# Patient Record
Sex: MALE | Race: WHITE | NOT HISPANIC OR LATINO | URBAN - METROPOLITAN AREA
[De-identification: names, ages, dates, MRNs, and addresses within clinical notes are randomized per-mention and may not be internally consistent; named-entity substitution may affect disease eponyms.]

---

## 2017-12-16 ENCOUNTER — INPATIENT (INPATIENT)
Facility: HOSPITAL | Age: 82
LOS: 5 days | Discharge: NURSING, REHAB OR LONG-TERM CARE | End: 2017-12-22
Attending: INTERNAL MEDICINE | Admitting: INTERNAL MEDICINE
Payer: MEDICARE

## 2017-12-16 VITALS
TEMPERATURE: 98 F | RESPIRATION RATE: 18 BRPM | SYSTOLIC BLOOD PRESSURE: 157 MMHG | HEART RATE: 68 BPM | OXYGEN SATURATION: 96 % | DIASTOLIC BLOOD PRESSURE: 66 MMHG

## 2017-12-16 DIAGNOSIS — M25.469 EFFUSION, UNSPECIFIED KNEE: ICD-10-CM

## 2017-12-16 DIAGNOSIS — Z95.0 PRESENCE OF CARDIAC PACEMAKER: Chronic | ICD-10-CM

## 2017-12-16 LAB
ALBUMIN SERPL ELPH-MCNC: 3.7 G/DL — SIGNIFICANT CHANGE UP (ref 3.3–5)
ALP SERPL-CCNC: 47 U/L — SIGNIFICANT CHANGE UP (ref 40–120)
ALT FLD-CCNC: 10 U/L — SIGNIFICANT CHANGE UP (ref 4–41)
APTT BLD: 35.3 SEC — SIGNIFICANT CHANGE UP (ref 27.5–37.4)
AST SERPL-CCNC: 25 U/L — SIGNIFICANT CHANGE UP (ref 4–40)
BASOPHILS # BLD AUTO: 0.04 K/UL — SIGNIFICANT CHANGE UP (ref 0–0.2)
BASOPHILS NFR BLD AUTO: 0.4 % — SIGNIFICANT CHANGE UP (ref 0–2)
BILIRUB SERPL-MCNC: 0.4 MG/DL — SIGNIFICANT CHANGE UP (ref 0.2–1.2)
BUN SERPL-MCNC: 22 MG/DL — SIGNIFICANT CHANGE UP (ref 7–23)
CALCIUM SERPL-MCNC: 9.3 MG/DL — SIGNIFICANT CHANGE UP (ref 8.4–10.5)
CHLORIDE SERPL-SCNC: 103 MMOL/L — SIGNIFICANT CHANGE UP (ref 98–107)
CO2 SERPL-SCNC: 22 MMOL/L — SIGNIFICANT CHANGE UP (ref 22–31)
CREAT SERPL-MCNC: 0.97 MG/DL — SIGNIFICANT CHANGE UP (ref 0.5–1.3)
CRP SERPL-MCNC: 10.4 MG/L — HIGH
EOSINOPHIL # BLD AUTO: 0.04 K/UL — SIGNIFICANT CHANGE UP (ref 0–0.5)
EOSINOPHIL NFR BLD AUTO: 0.4 % — SIGNIFICANT CHANGE UP (ref 0–6)
ERYTHROCYTE [SEDIMENTATION RATE] IN BLOOD: 79 MM/HR — HIGH (ref 1–15)
GLUCOSE SERPL-MCNC: 135 MG/DL — HIGH (ref 70–99)
HCT VFR BLD CALC: 31.2 % — LOW (ref 39–50)
HGB BLD-MCNC: 10.2 G/DL — LOW (ref 13–17)
IMM GRANULOCYTES # BLD AUTO: 0.04 # — SIGNIFICANT CHANGE UP
IMM GRANULOCYTES NFR BLD AUTO: 0.4 % — SIGNIFICANT CHANGE UP (ref 0–1.5)
INR BLD: 1.37 — HIGH (ref 0.88–1.17)
LYMPHOCYTES # BLD AUTO: 2.68 K/UL — SIGNIFICANT CHANGE UP (ref 1–3.3)
LYMPHOCYTES # BLD AUTO: 25.2 % — SIGNIFICANT CHANGE UP (ref 13–44)
MCHC RBC-ENTMCNC: 31 PG — SIGNIFICANT CHANGE UP (ref 27–34)
MCHC RBC-ENTMCNC: 32.7 % — SIGNIFICANT CHANGE UP (ref 32–36)
MCV RBC AUTO: 94.8 FL — SIGNIFICANT CHANGE UP (ref 80–100)
MONOCYTES # BLD AUTO: 1.04 K/UL — HIGH (ref 0–0.9)
MONOCYTES NFR BLD AUTO: 9.8 % — SIGNIFICANT CHANGE UP (ref 2–14)
NEUTROPHILS # BLD AUTO: 6.78 K/UL — SIGNIFICANT CHANGE UP (ref 1.8–7.4)
NEUTROPHILS NFR BLD AUTO: 63.8 % — SIGNIFICANT CHANGE UP (ref 43–77)
NRBC # FLD: 0 — SIGNIFICANT CHANGE UP
PLATELET # BLD AUTO: 274 K/UL — SIGNIFICANT CHANGE UP (ref 150–400)
PMV BLD: 10 FL — SIGNIFICANT CHANGE UP (ref 7–13)
POTASSIUM SERPL-MCNC: 4.7 MMOL/L — SIGNIFICANT CHANGE UP (ref 3.5–5.3)
POTASSIUM SERPL-SCNC: 4.7 MMOL/L — SIGNIFICANT CHANGE UP (ref 3.5–5.3)
PROT SERPL-MCNC: 6.9 G/DL — SIGNIFICANT CHANGE UP (ref 6–8.3)
PROTHROM AB SERPL-ACNC: 15.8 SEC — HIGH (ref 9.8–13.1)
RBC # BLD: 3.29 M/UL — LOW (ref 4.2–5.8)
RBC # FLD: 16.3 % — HIGH (ref 10.3–14.5)
SODIUM SERPL-SCNC: 140 MMOL/L — SIGNIFICANT CHANGE UP (ref 135–145)
WBC # BLD: 10.62 K/UL — HIGH (ref 3.8–10.5)
WBC # FLD AUTO: 10.62 K/UL — HIGH (ref 3.8–10.5)

## 2017-12-16 PROCEDURE — 73562 X-RAY EXAM OF KNEE 3: CPT | Mod: 26,LT

## 2017-12-16 RX ORDER — EPINEPHRINE 0.3 MG/.3ML
0.3 INJECTION INTRAMUSCULAR; SUBCUTANEOUS
Qty: 0.3 | Refills: 0 | OUTPATIENT
Start: 2017-12-16 | End: 2017-12-17

## 2017-12-16 RX ORDER — ACETAMINOPHEN 500 MG
650 TABLET ORAL ONCE
Qty: 0 | Refills: 0 | Status: COMPLETED | OUTPATIENT
Start: 2017-12-16 | End: 2017-12-16

## 2017-12-16 RX ORDER — MORPHINE SULFATE 50 MG/1
4 CAPSULE, EXTENDED RELEASE ORAL ONCE
Qty: 0 | Refills: 0 | Status: DISCONTINUED | OUTPATIENT
Start: 2017-12-16 | End: 2017-12-16

## 2017-12-16 RX ORDER — CETIRIZINE HYDROCHLORIDE 10 MG/1
1 TABLET ORAL
Qty: 10 | Refills: 0 | OUTPATIENT
Start: 2017-12-16 | End: 2017-12-25

## 2017-12-16 RX ORDER — FAMOTIDINE 10 MG/ML
1 INJECTION INTRAVENOUS
Qty: 20 | Refills: 0 | OUTPATIENT
Start: 2017-12-16 | End: 2017-12-25

## 2017-12-16 RX ORDER — OXYCODONE AND ACETAMINOPHEN 5; 325 MG/1; MG/1
1 TABLET ORAL ONCE
Qty: 0 | Refills: 0 | Status: DISCONTINUED | OUTPATIENT
Start: 2017-12-16 | End: 2017-12-16

## 2017-12-16 RX ADMIN — Medication 650 MILLIGRAM(S): at 21:42

## 2017-12-16 RX ADMIN — MORPHINE SULFATE 4 MILLIGRAM(S): 50 CAPSULE, EXTENDED RELEASE ORAL at 21:42

## 2017-12-16 NOTE — ED PROVIDER NOTE - PMH
Congestive heart failure, unspecified congestive heart failure chronicity, unspecified congestive heart failure type    Essential hypertension    Pacemaker

## 2017-12-16 NOTE — ED PROVIDER NOTE - ATTENDING CONTRIBUTION TO CARE
Attending Statement: I have personally seen and examined this patient. I have fully participated in the care of this patient. I have reviewed all pertinent clinical information, including history physical exam, plan and the Resident's note and agree except as noted   89yo M hx of CHF, PPM, on eliquis, OA, HTN, pw left knee pain since this morning. pt woke up this morning w swelling and pain wo any hx of trauma. no numbness or weakness. not able to walk due to pain. no fever or chills. no numbness or weakness.   Vital signs noted. pt looks uncomfortable. no shortening or rotation of lower ext. +swelling of the left knee w dec rom due to pain. not warm. no red. no calf tenderness.   plan labs, pain med, re assess

## 2017-12-16 NOTE — ED ADULT NURSE NOTE - OBJECTIVE STATEMENT
Patient rec'd for left knee pain and swelling started this am. No trauma or injuries. On eliquis. No wounds or bruising observed at bedside. Unable to eleavte leg tdue to pain, walks at home with walker currently unable to ambulate. Dependent edema observed around b/l ankles, daughter states it is more than usual. No warmth or redness observed to affected knee. Appears uncomfortable grunting and endorses pain when moving extremity.  Hx of PPM, AFiB, CHF, Cardiomyopathy, HTN

## 2017-12-16 NOTE — ED PROVIDER NOTE - MUSCULOSKELETAL MINIMAL EXAM
left knee swelling, pain with passive ROM. Neurovascularly intact distal to injury. No skin changes. No warmth of joint/atraumatic

## 2017-12-16 NOTE — ED PROVIDER NOTE - MEDICAL DECISION MAKING DETAILS
89M CHF PPM on eliquis HTN p/w left knee swelling and pain concerning for hemarthrosis. Septic joint unlikely. Afebrile. No erythema or warmth. History of falls but denies fall today or yesterday. Xray, labs, ortho, pain control, rectal temp

## 2017-12-16 NOTE — ED PROVIDER NOTE - PROGRESS NOTE DETAILS
No fracture on xray. Most likely hemarthrosis. Will admit as pt is unable to ambulate. Pain mildly improved, still requiring more pain meds. Will admit for pain control and ortho consult

## 2017-12-16 NOTE — ED PROVIDER NOTE - OBJECTIVE STATEMENT
89yo male pmh CHF, PPM on eliquis, OA, HTN p/w left knee pain and swelling since this morning. No trauma or falls in the past 2 months. C/o subjective fevers. Unable to ambulate 2/2 pain. No chills. 1 episode of posttussive emesis today. Back pain at baseline. Denies abdominal pain, diarrhea, chest pain, dyspnea.    Translation by daughter and daughter-in-law

## 2017-12-17 DIAGNOSIS — Z79.899 OTHER LONG TERM (CURRENT) DRUG THERAPY: ICD-10-CM

## 2017-12-17 DIAGNOSIS — I10 ESSENTIAL (PRIMARY) HYPERTENSION: ICD-10-CM

## 2017-12-17 DIAGNOSIS — M54.9 DORSALGIA, UNSPECIFIED: ICD-10-CM

## 2017-12-17 DIAGNOSIS — M25.469 EFFUSION, UNSPECIFIED KNEE: ICD-10-CM

## 2017-12-17 DIAGNOSIS — Z29.9 ENCOUNTER FOR PROPHYLACTIC MEASURES, UNSPECIFIED: ICD-10-CM

## 2017-12-17 DIAGNOSIS — Z51.81 ENCOUNTER FOR THERAPEUTIC DRUG LEVEL MONITORING: ICD-10-CM

## 2017-12-17 DIAGNOSIS — R63.4 ABNORMAL WEIGHT LOSS: ICD-10-CM

## 2017-12-17 DIAGNOSIS — I50.9 HEART FAILURE, UNSPECIFIED: ICD-10-CM

## 2017-12-17 DIAGNOSIS — E86.0 DEHYDRATION: ICD-10-CM
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
## 2017-12-17 DIAGNOSIS — M25.00 HEMARTHROSIS, UNSPECIFIED JOINT: ICD-10-CM

## 2017-12-17 DIAGNOSIS — I48.91 UNSPECIFIED ATRIAL FIBRILLATION: ICD-10-CM

## 2017-12-17 LAB
APPEARANCE UR: CLEAR — SIGNIFICANT CHANGE UP
BILIRUB UR-MCNC: NEGATIVE — SIGNIFICANT CHANGE UP
BLOOD UR QL VISUAL: NEGATIVE — SIGNIFICANT CHANGE UP
BODY FLUID TYPE: SIGNIFICANT CHANGE UP
BUN SERPL-MCNC: 25 MG/DL — HIGH (ref 7–23)
CALCIUM SERPL-MCNC: 9.1 MG/DL — SIGNIFICANT CHANGE UP (ref 8.4–10.5)
CHLORIDE SERPL-SCNC: 104 MMOL/L — SIGNIFICANT CHANGE UP (ref 98–107)
CLARITY SPEC: SIGNIFICANT CHANGE UP
CO2 SERPL-SCNC: 18 MMOL/L — LOW (ref 22–31)
COLOR FLD: SIGNIFICANT CHANGE UP
COLOR SPEC: YELLOW — SIGNIFICANT CHANGE UP
CREAT SERPL-MCNC: 0.99 MG/DL — SIGNIFICANT CHANGE UP (ref 0.5–1.3)
CRYSTALS FLD MICRO: SIGNIFICANT CHANGE UP
EOSINOPHIL # FLD: 10 % — SIGNIFICANT CHANGE UP
GLUCOSE SERPL-MCNC: 122 MG/DL — HIGH (ref 70–99)
GLUCOSE UR-MCNC: NEGATIVE — SIGNIFICANT CHANGE UP
GRAM STN FLD: SIGNIFICANT CHANGE UP
HCT VFR BLD CALC: 30.5 % — LOW (ref 39–50)
HGB BLD-MCNC: 10 G/DL — LOW (ref 13–17)
HYALINE CASTS # UR AUTO: SIGNIFICANT CHANGE UP (ref 0–?)
KETONES UR-MCNC: SIGNIFICANT CHANGE UP
LEUKOCYTE ESTERASE UR-ACNC: NEGATIVE — SIGNIFICANT CHANGE UP
LYMPHOCYTES NFR FLD: 27 % — SIGNIFICANT CHANGE UP
MAGNESIUM SERPL-MCNC: 2.2 MG/DL — SIGNIFICANT CHANGE UP (ref 1.6–2.6)
MCHC RBC-ENTMCNC: 31.4 PG — SIGNIFICANT CHANGE UP (ref 27–34)
MCHC RBC-ENTMCNC: 32.8 % — SIGNIFICANT CHANGE UP (ref 32–36)
MCV RBC AUTO: 95.9 FL — SIGNIFICANT CHANGE UP (ref 80–100)
MONOCYTES # FLD: 2 % — SIGNIFICANT CHANGE UP
MUCOUS THREADS # UR AUTO: SIGNIFICANT CHANGE UP
NEUTS SEG NFR FLD MANUAL: 61 % — SIGNIFICANT CHANGE UP
NITRITE UR-MCNC: NEGATIVE — SIGNIFICANT CHANGE UP
NRBC # FLD: 0 — SIGNIFICANT CHANGE UP
PH UR: 5.5 — SIGNIFICANT CHANGE UP (ref 4.6–8)
PHOSPHATE SERPL-MCNC: 3.5 MG/DL — SIGNIFICANT CHANGE UP (ref 2.5–4.5)
PLATELET # BLD AUTO: 257 K/UL — SIGNIFICANT CHANGE UP (ref 150–400)
PMV BLD: 10 FL — SIGNIFICANT CHANGE UP (ref 7–13)
POTASSIUM SERPL-MCNC: 4.6 MMOL/L — SIGNIFICANT CHANGE UP (ref 3.5–5.3)
POTASSIUM SERPL-SCNC: 4.6 MMOL/L — SIGNIFICANT CHANGE UP (ref 3.5–5.3)
PROT UR-MCNC: 30 MG/DL — HIGH
RBC # BLD: 3.18 M/UL — LOW (ref 4.2–5.8)
RBC # FLD: 16.6 % — HIGH (ref 10.3–14.5)
RBC CASTS # UR COMP ASSIST: SIGNIFICANT CHANGE UP (ref 0–?)
RCV VOL RI: HIGH CELL/UL (ref 0–5)
SODIUM SERPL-SCNC: 139 MMOL/L — SIGNIFICANT CHANGE UP (ref 135–145)
SP GR SPEC: 1.02 — SIGNIFICANT CHANGE UP (ref 1–1.04)
SPECIMEN SOURCE: SIGNIFICANT CHANGE UP
SQUAMOUS # UR AUTO: SIGNIFICANT CHANGE UP
TOTAL CELLS COUNTED, BODY FLUID: 100 CELLS — SIGNIFICANT CHANGE UP
TOTAL NUCLEATED CELL COUNT, BODY FLUID: 2436 CELL/UL — HIGH (ref 0–5)
UROBILINOGEN FLD QL: NORMAL MG/DL — SIGNIFICANT CHANGE UP
WBC # BLD: 13.9 K/UL — HIGH (ref 3.8–10.5)
WBC # FLD AUTO: 13.9 K/UL — HIGH (ref 3.8–10.5)
WBC UR QL: SIGNIFICANT CHANGE UP (ref 0–?)

## 2017-12-17 PROCEDURE — 93010 ELECTROCARDIOGRAM REPORT: CPT

## 2017-12-17 PROCEDURE — 99233 SBSQ HOSP IP/OBS HIGH 50: CPT | Mod: GC

## 2017-12-17 PROCEDURE — 72100 X-RAY EXAM L-S SPINE 2/3 VWS: CPT | Mod: 26

## 2017-12-17 PROCEDURE — 99223 1ST HOSP IP/OBS HIGH 75: CPT | Mod: GC

## 2017-12-17 RX ORDER — LIDOCAINE 4 G/100G
1 CREAM TOPICAL DAILY
Qty: 0 | Refills: 0 | Status: DISCONTINUED | OUTPATIENT
Start: 2017-12-17 | End: 2017-12-22

## 2017-12-17 RX ORDER — ACETAMINOPHEN 500 MG
650 TABLET ORAL EVERY 6 HOURS
Qty: 0 | Refills: 0 | Status: DISCONTINUED | OUTPATIENT
Start: 2017-12-17 | End: 2017-12-17

## 2017-12-17 RX ORDER — OLANZAPINE 15 MG/1
5 TABLET, FILM COATED ORAL ONCE
Qty: 0 | Refills: 0 | Status: COMPLETED | OUTPATIENT
Start: 2017-12-17 | End: 2017-12-17

## 2017-12-17 RX ORDER — FERROUS SULFATE 325(65) MG
1 TABLET ORAL
Qty: 0 | Refills: 0 | COMMUNITY

## 2017-12-17 RX ORDER — ATORVASTATIN CALCIUM 80 MG/1
10 TABLET, FILM COATED ORAL AT BEDTIME
Qty: 0 | Refills: 0 | Status: DISCONTINUED | OUTPATIENT
Start: 2017-12-17 | End: 2017-12-22

## 2017-12-17 RX ORDER — LOSARTAN POTASSIUM 100 MG/1
50 TABLET, FILM COATED ORAL DAILY
Qty: 0 | Refills: 0 | Status: DISCONTINUED | OUTPATIENT
Start: 2017-12-17 | End: 2017-12-22

## 2017-12-17 RX ORDER — AMLODIPINE BESYLATE 2.5 MG/1
1 TABLET ORAL
Qty: 0 | Refills: 0 | COMMUNITY

## 2017-12-17 RX ORDER — LIDOCAINE 4 G/100G
1 CREAM TOPICAL ONCE
Qty: 0 | Refills: 0 | Status: COMPLETED | OUTPATIENT
Start: 2017-12-17 | End: 2017-12-17

## 2017-12-17 RX ORDER — FERROUS SULFATE 325(65) MG
325 TABLET ORAL DAILY
Qty: 0 | Refills: 0 | Status: DISCONTINUED | OUTPATIENT
Start: 2017-12-17 | End: 2017-12-22

## 2017-12-17 RX ORDER — OXYCODONE HYDROCHLORIDE 5 MG/1
5 TABLET ORAL ONCE
Qty: 0 | Refills: 0 | Status: DISCONTINUED | OUTPATIENT
Start: 2017-12-17 | End: 2017-12-17

## 2017-12-17 RX ORDER — METOPROLOL TARTRATE 50 MG
1 TABLET ORAL
Qty: 0 | Refills: 0 | COMMUNITY

## 2017-12-17 RX ORDER — KETOROLAC TROMETHAMINE 30 MG/ML
15 SYRINGE (ML) INJECTION ONCE
Qty: 0 | Refills: 0 | Status: DISCONTINUED | OUTPATIENT
Start: 2017-12-17 | End: 2017-12-17

## 2017-12-17 RX ORDER — ACETAMINOPHEN 500 MG
650 TABLET ORAL EVERY 8 HOURS
Qty: 0 | Refills: 0 | Status: DISCONTINUED | OUTPATIENT
Start: 2017-12-17 | End: 2017-12-22

## 2017-12-17 RX ORDER — AMLODIPINE BESYLATE 2.5 MG/1
5 TABLET ORAL DAILY
Qty: 0 | Refills: 0 | Status: DISCONTINUED | OUTPATIENT
Start: 2017-12-17 | End: 2017-12-22

## 2017-12-17 RX ORDER — MAGNESIUM OXIDE 400 MG ORAL TABLET 241.3 MG
1 TABLET ORAL
Qty: 0 | Refills: 0 | COMMUNITY

## 2017-12-17 RX ORDER — METOPROLOL TARTRATE 50 MG
25 TABLET ORAL
Qty: 0 | Refills: 0 | Status: DISCONTINUED | OUTPATIENT
Start: 2017-12-17 | End: 2017-12-22

## 2017-12-17 RX ADMIN — Medication 650 MILLIGRAM(S): at 22:00

## 2017-12-17 RX ADMIN — OLANZAPINE 5 MILLIGRAM(S): 15 TABLET, FILM COATED ORAL at 22:02

## 2017-12-17 RX ADMIN — ATORVASTATIN CALCIUM 10 MILLIGRAM(S): 80 TABLET, FILM COATED ORAL at 21:23

## 2017-12-17 RX ADMIN — Medication 650 MILLIGRAM(S): at 21:18

## 2017-12-17 RX ADMIN — OXYCODONE HYDROCHLORIDE 5 MILLIGRAM(S): 5 TABLET ORAL at 22:02

## 2017-12-17 RX ADMIN — LOSARTAN POTASSIUM 50 MILLIGRAM(S): 100 TABLET, FILM COATED ORAL at 18:55

## 2017-12-17 RX ADMIN — Medication 650 MILLIGRAM(S): at 11:45

## 2017-12-17 RX ADMIN — LIDOCAINE 1 PATCH: 4 CREAM TOPICAL at 01:35

## 2017-12-17 RX ADMIN — MORPHINE SULFATE 4 MILLIGRAM(S): 50 CAPSULE, EXTENDED RELEASE ORAL at 00:13

## 2017-12-17 RX ADMIN — Medication 25 MILLIGRAM(S): at 18:55

## 2017-12-17 RX ADMIN — Medication 325 MILLIGRAM(S): at 11:15

## 2017-12-17 RX ADMIN — AMLODIPINE BESYLATE 5 MILLIGRAM(S): 2.5 TABLET ORAL at 15:40

## 2017-12-17 RX ADMIN — LIDOCAINE 1 PATCH: 4 CREAM TOPICAL at 13:56

## 2017-12-17 RX ADMIN — Medication 650 MILLIGRAM(S): at 11:14

## 2017-12-17 RX ADMIN — Medication 1 TABLET(S): at 11:14

## 2017-12-17 RX ADMIN — LIDOCAINE 1 PATCH: 4 CREAM TOPICAL at 12:49

## 2017-12-17 RX ADMIN — Medication 15 MILLIGRAM(S): at 01:35

## 2017-12-17 RX ADMIN — OXYCODONE HYDROCHLORIDE 5 MILLIGRAM(S): 5 TABLET ORAL at 23:01

## 2017-12-17 NOTE — H&P ADULT - NSHPLABSRESULTS_GEN_ALL_CORE
Labs reviewed by me  arthocentesis with many rbcs, around 2000 nucleated cells, elevated CRP, ESR    Radiology reviewed by me  L knee xray with large effusion Arthrocentesis with many rbcs, around 2000 nucleated cells, elevated CRP, ESR    Radiology reviewed by me  Lt knee xray with large effusion Arthrocentesis with many rbcs, around 2000 nucleated cells, elevated CRP, ESR    Radiology reviewed by me  Lt knee xray with large effusion    EKG, 12/17/17, Ventricularly paced, 73bpm - my reading, no prior EKG to compare

## 2017-12-17 NOTE — H&P ADULT - LYMPHATIC
posterior cervical L/anterior cervical L/anterior cervical R/axillary L/posterior cervical R/supraclavicular L/inguinal R/inguinal L/supraclavicular R

## 2017-12-17 NOTE — H&P ADULT - ASSESSMENT
89M PMHx CHF, s/p PPM, OA, HTN here with large L knee effusion s/p arthrocentesis. 89yo Male Hx CHF, s/p PPM, OA, HTN a/w Lt knee pain due to traumatic large Lt knee effusion in the context of recent multiple falls and frailty; Arthrocentesis c/w hemarthrosis; Also found to be dehydrated and anemic;

## 2017-12-17 NOTE — PHYSICAL THERAPY INITIAL EVALUATION ADULT - ACTIVE RANGE OF MOTION EXAMINATION, REHAB EVAL
Right LE Active ROM was WFL (within functional limits)/Left Knee flexion ~50 degrees 2/2 to pain/bilateral upper extremity Active ROM was WFL (within functional limits)

## 2017-12-17 NOTE — PROGRESS NOTE ADULT - PROBLEM SELECTOR PLAN 4
Per family patient is on Eliquis for atrial fibrillation s/p PPM placement 5-6 years ago for atrial fibrillation.  -hold Eliquis 5mg BID 2/2 hemarthrosis.  -c/w home metoprolol 50mg ER q24h, start as tartrate BID

## 2017-12-17 NOTE — PROGRESS NOTE ADULT - SUBJECTIVE AND OBJECTIVE BOX
Patient is a 89y old  Male who presents with a chief complaint of Lt knee pain (17 Dec 2017 05:00)    SUBJECTIVE / OVERNIGHT EVENTS: Admitted overnight with knee pain after fall, with arthrocentesis performed in ED. Interview performed with [place]  service. Pt continues to endorse L knee pain which is improved since tap and ACE wrap applied. He otherwise has no complaints this AM and had no events overnight. He is minimally participatory with questioning, however. He denies fevers, chills, CP, SOB, change in bowel habits, headache, change in vision, new weakness or numbness.     MEDICATIONS  (STANDING):    MEDICATIONS  (PRN):  acetaminophen   Tablet. 650 milliGRAM(s) Oral every 6 hours PRN mild and moderate pain    CAPILLARY BLOOD GLUCOSE: n/a    I&O's Summary: n/a    Vital Signs Last 24 Hrs  T(C): 36.6 (17 Dec 2017 03:17), Max: 37 (16 Dec 2017 21:33)  T(F): 97.9 (17 Dec 2017 03:17), Max: 98.6 (16 Dec 2017 21:33)  HR: 70 (17 Dec 2017 03:17) (62 - 70)  BP: 140/69 (17 Dec 2017 03:17) (140/69 - 193/75)  BP(mean): --  RR: 16 (17 Dec 2017 03:17) (16 - 20)  SpO2: 96% (17 Dec 2017 03:17) (96% - 96%)    PHYSICAL EXAM:  GENERAL: NAD lying flat in bed, awake and responsive   HEAD:  Atraumatic, Normocephalic  EYES: arcus senilis, EOMI, conj clear, PERRLA  ENMT: No tonsillar erythema, exudates, or enlargement; Moist mucous membranes  NECK: Supple, ROM intact  NERVOUS SYSTEM: AOX1, 5/5 strength RLE, unable to assess LLE strength 2/2 pain  PSYCHIATRIC: Appropriate affect and mood  CHEST/LUNG: CTAB no rales or wheezes  HEART: RRR no MRG, +S1/2  ABDOMEN: Soft, Nontender, Nondistended; Bowel sounds present  EXTREMITIES:  LLE swelling L knee, ACE wrap in place  SKIN: no visible abrasions, bruising L knee      LABS:                        10.2   10.62 )-----------( 274      ( 16 Dec 2017 21:19 )             31.2     12-16    140  |  103  |  22  ----------------------------<  135<H>  4.7   |  22  |  0.97    Ca    9.3      16 Dec 2017 21:19    TPro  6.9  /  Alb  3.7  /  TBili  0.4  /  DBili  x   /  AST  25  /  ALT  10  /  AlkPhos  47  12-16    PT/INR - ( 16 Dec 2017 21:19 )   PT: 15.8 SEC;   INR: 1.37          PTT - ( 16 Dec 2017 21:19 )  PTT:35.3 SEC        RADIOLOGY & ADDITIONAL TESTS:    Imaging Personally Reviewed:  < from: Xray Knee 3 Views, Left (12.16.17 @ 23:51) >    INTERPRETATION:  large joint effusion  naf    < end of copied text >      Consultant(s) Notes Reviewed:  orthopedics    Care Discussed with Consultants/Other Providers: admitting resident

## 2017-12-17 NOTE — PROGRESS NOTE ADULT - PROBLEM SELECTOR PLAN 2
Pt and family deny trauma while patient ambulated to bathroom. He woke up with painful knee. Pt denies syncope but baseline mental status A&Ox1-2 per family; he has forgotten his date of birth. Family reports pt had mechanical fall on steps in [place] in July/August complicated by rib fractures and PNA during hospitalization. He came to live with family here 1.5 months ago without recurrent fall.  -EKG v-paced  -PT/OT  -Fall precaution  -Consider PPM assessment.

## 2017-12-17 NOTE — H&P ADULT - PROBLEM SELECTOR PLAN 4
pt unclear on medications  need med rec in am Unable to perform medication reconciliation; Pt on unknown medication;   -Primary team to clarify home meds in am

## 2017-12-17 NOTE — H&P ADULT - PROBLEM SELECTOR PLAN 5
start NS 75 cc/hr pt unclear on medications  need med rec in am Unknown medications -  to be clarified in am   Monitor BP

## 2017-12-17 NOTE — H&P ADULT - MUSCULOSKELETAL
details… detailed exam decreased ROM due to pain/joint swelling decreased ROM due to pain/joint swelling/Lt knee

## 2017-12-17 NOTE — CONSULT NOTE ADULT - SUBJECTIVE AND OBJECTIVE BOX
Orthopaedic Surgery Consult Note    Patient is a 89y old  Male who presents with a chief complaint of left knee pain and swelling  HPI:89 year old male on blood thinners no known history of fall.  Walks with cane or walker at baseline per daughter.  Per daughter father woke up today with increased knee pain and swelling this am.  No fevers or chills. Last fall was in june.  Patient states he has left knee pain and calf pain.  Worse with movement.      PAST MEDICAL & SURGICAL HISTORY:  Pacemaker  Congestive heart failure, unspecified congestive heart failure chronicity, unspecified congestive heart failure type  Essential hypertension  Artificial pacemaker    [] No significant past history as reviewed with the patient and family    FAMILY HISTORY:  No pertinent family history in first degree relatives    [] Family history not pertinent as reviewed with the patient and family    SOCIAL HISTORY:    MEDICATIONS  (STANDING):    MEDICATIONS  (PRN):    Allergies    No Known Allergies    Intolerances        REVIEW OF SYSTEMS  [x] All review of systems negative except for those marked.  Systemic:	[] Fever		[] Chills		[] Night sweats		[] Fatigue	[] Other  [] Cardiovascular:  [] Pulmonary:  [] Renal/Urologic:  [] Gastrointestinal:  [] Metabolic:  [] Neurologic:  [] Hematologic:  [] ENT:  [] Ophthalmologic:  [] Musculoskeletal: see HPI    Vital Signs Last 24 Hrs  T(C): 37 (16 Dec 2017 21:33), Max: 37 (16 Dec 2017 21:33)  T(F): 98.6 (16 Dec 2017 21:33), Max: 98.6 (16 Dec 2017 21:33)  HR: 62 (16 Dec 2017 23:27) (62 - 68)  BP: 172/63 (16 Dec 2017 23:27) (157/66 - 193/75)  BP(mean): --  RR: 18 (16 Dec 2017 23:27) (18 - 20)  SpO2: 96% (16 Dec 2017 23:27) (96% - 96%)      PHYSICAL EXAM:  NAD  LLE: skin intact  no erythema or warmth  Large effusion  Range of motion restricted secondary to pain.  No bruising  EHL/FHL/TA/GS intact  SILT DP/SP/MCINTOSH/Sa/T  WWP distally   dp palpable                          10.2   10.62 )-----------( 274      ( 16 Dec 2017 21:19 )             31.2     12-16    140  |  103  |  22  ----------------------------<  135<H>  4.7   |  22  |  0.97    Ca    9.3      16 Dec 2017 21:19    TPro  6.9  /  Alb  3.7  /  TBili  0.4  /  DBili  x   /  AST  25  /  ALT  10  /  AlkPhos  47  12-16    PT/INR - ( 16 Dec 2017 21:19 )   PT: 15.8 SEC;   INR: 1.37          PTT - ( 16 Dec 2017 21:19 )  PTT:35.3 SEC    ESR 79  CRP 10.4  IMAGING STUDIES:  X-ray left knee: No fractures or dislocations  89y Male with left knee effusion and elevated ESR/CRP on blood thinners.    Left knee was aspirated udner usual sterile precaution 50 cc of blood was returned.  Sent for Gram Stain/Culture/Crystals/Cell count  WBAT LLE  No acute surgical intervention at this time.  PT/OT/OOB

## 2017-12-17 NOTE — PHYSICAL THERAPY INITIAL EVALUATION ADULT - MANUAL MUSCLE TESTING RESULTS, REHAB EVAL
grossly assessed due to/cognitive impairment; bilateral UE at least 3/5, right LE at least 3/5, and left LE 3-/5

## 2017-12-17 NOTE — CONSULT NOTE ADULT - ATTENDING COMMENTS
L knee pain in setting of hemarthrosis as per aspiration. will f/u cultures and gram stain, however in setting of 2k WBCs on cell count this is likely not a septic joint.

## 2017-12-17 NOTE — H&P ADULT - MOTOR
grossly 5/5 flexion-extension b/l UE and Rt LE  Lt LE exam limited due to knee pain, able to flex-extend foot, lift the extremity off bed

## 2017-12-17 NOTE — H&P ADULT - PROBLEM SELECTOR PLAN 1
s/p tap by ortho  does not appear septic, will f/u cultures  many rbcs, hold eliquis at this point  suspect hemarthrosis Traumatic in the context of INR and reported Eliquis regimen;  s/p arthrocentesis of Lt knee by Ortho Sx c/w hemarthrosis   Does not appear septic, will f/u cultures  many rbcs, hold eliquis at this point  suspect hemarthrosis

## 2017-12-17 NOTE — PROGRESS NOTE ADULT - ASSESSMENT
89yo Male Hx CHF (unknown EF), atrial fibrillation (on Eliquis) s/p PPM 5-6 years ago at [place], OA, prostate cancer s/p seed implants and radiation therapy 10-15 years ago, HTN, baseline A&O1-2 (ambulates with walker) a/w Lt knee pain due to traumatic large Lt knee effusion in the context of recent mechanical fall August 2017; Arthrocentesis c/w hemarthrosis; Also found to be dehydrated and anemic.

## 2017-12-17 NOTE — PHYSICAL THERAPY INITIAL EVALUATION ADULT - CRITERIA FOR SKILLED THERAPEUTIC INTERVENTIONS
risk reduction/prevention/functional limitations in following categories/impairments found/rehab potential

## 2017-12-17 NOTE — H&P ADULT - PROBLEM SELECTOR PLAN 3
holding eliquis s/p tap by ortho  does not appear septic, will f/u cultures  many rbcs, hold eliquis at this point  suspect hemarthrosis s/p arthrocentesis of Lt knee by Ortho Sx c/w hemarthrosis; Pain largely resolved;   does not appear septic, will f/u cultures  many rbcs, hold eliquis at this point  suspect hemarthrosis Pt on Eliquis likely for A-fib; This needs to be confirmed with the family of PMD in am;  Eliquis dose unknown and held as above Pt on Eliquis likely for A-fib; This needs to be confirmed with the family or PMD in am;  Eliquis dose unknown and held as above due to hemarthrosis

## 2017-12-17 NOTE — PROGRESS NOTE ADULT - PROBLEM SELECTOR PLAN 1
Family reports that pt ambulated to bathroom overnight and denies falling or trauma, then woke up in the AM with painful L knee, no clear evidence of trauma. Arthrocentesis performed in ED with labs concerning for hemarthrosis, likely 2/2 trauma in the setting of a/c.  -Hold Eliquis for now.  -s/p arthrocentesis of Lt knee by orthopedics  -Gram stain NGTD, f/u cultures  -Orthopedics recommendations appreciated.

## 2017-12-17 NOTE — PHYSICAL THERAPY INITIAL EVALUATION ADULT - PASSIVE RANGE OF MOTION EXAMINATION, REHAB EVAL
bilateral upper extremity Passive ROM was WFL (within functional limits)/Left Knee flexion ~55 degrees 2/2 to pain/Right LE Passive ROM was WFL (within functional limits)

## 2017-12-17 NOTE — PROGRESS NOTE ADULT - PROBLEM SELECTOR PLAN 3
Pt was diagnosed with CHF at [facility] in October 2017 per family, started on Lasix 40mg po q24h, held 2 weeks ago by his cardiologist as the dose was thought to be too high, per family.  -Hold Lasix  -daily weights  -monitor volume status.   -TTE for baseline.  -Obtain [facility] records.

## 2017-12-17 NOTE — PHYSICAL THERAPY INITIAL EVALUATION ADULT - PERTINENT HX OF CURRENT PROBLEM, REHAB EVAL
Pt is a 89yo Male Hx CHF, s/p PPM, OA, HTN a/w Left knee pain due to traumatic large Left knee effusion in the context of recent multiple falls and frailty; Arthrocentesis c/w hemarthrosis; Also found to be dehydrated and anemic

## 2017-12-17 NOTE — PHYSICAL THERAPY INITIAL EVALUATION ADULT - ADDITIONAL COMMENTS
Pt is a poor historian 2/2 to cognitive impairment; information needs to be verified by family member. Pt reports that he lives in a private house with wife; unsure if pt has stairs; pt did not understand the question with the . Prior to hospital admission pt reports that he ambulated independently using single axis cane and reports multiple recent falls.    Pt left comfortable in bed, NAD, all lines intact, all precautions maintained, with call bell in reach, bed alarm on and RN aware of PT evaluation/pt's pain Pt is a poor historian 2/2 to cognitive impairment; information needs to be verified by a family member. Pt reports that he lives in a private house with wife; unsure if pt has stairs; pt did not understand the question with the . Prior to hospital admission pt reports that he ambulated independently using single axis cane and reports multiple recent falls.    Pt left comfortable in bed, NAD, all lines intact, all precautions maintained, with call bell in reach, bed alarm on and RN aware of PT evaluation/pt's pain

## 2017-12-17 NOTE — H&P ADULT - PROBLEM SELECTOR PLAN 2
pt unclear on medications  need med rec in am will need further history with family to r/o syncope  check ekg  PT Will need to obtain more detailed collateral form the family in am  (primary team) to confirm mechanical nature of falls rather than cardiogenic (r/o syncope);   EKG   PT evaluation  Fall precaution Will need to obtain more detailed collateral form the family in am  (primary team) to confirm mechanical nature of falls rather than cardiogenic (r/o syncope);   EKG ventricularly paced, 73bpm  PT evaluation  Fall precaution

## 2017-12-17 NOTE — H&P ADULT - HISTORY OF PRESENT ILLNESS
89M PMHx CHF, s/p PPM, OA, HTN here with L knee pain. Pt alone at bedside, a+o x1, history is inconsistent but he is able to provide some history. He states he had fall three mos ago in which he broke 4 ribs. Since that time he has been noticing L knee pain. No ecchymosis noted or bleeding. He otherwise denies fever, CP, SOB, abd pain, dysuria. Per ED notes he appears to be taking eliquis but unclear reason.     In the ED vitals were T 97.6, HR 68, /66, RR 18, O2 96 RA. 89M PMHx CHF, s/p PPM, OA, HTN here with L knee pain. Pt alone at bedside, a+o x1, history is inconsistent but he is able to provide some history. He states he had fall three mos ago in which he broke 4 ribs. Since that time he has been noticing L knee pain. No ecchymosis noted or bleeding. He otherwise denies fever, CP, SOB, abd pain, dysuria. Per ED notes he appears to be taking eliquis but unclear reason. Per ED notes, daughter and daughter-in-law report frequent falls in past few months.    In the ED vitals were T 97.6, HR 68, /66, RR 18, O2 96 RA. 89yo Male Hx CHF, s/p PPM, OA, HTN c/o Lt knee pain. Pt AO x1, history provided by the pt is limited due to dementia. States that had a fall three mos ago in which he broke 4 ribs. Since that time he has been noticing also Lt knee pain. No ecchymosis noted or bleeding. He otherwise reports no fever, CP, SOB, abd pain, dysuria. Per ED notes the pt is on a/c Eliquis, however, indication is unclear. Per ED notes, daughter and daughter-in-law report frequent falls in past few months.  Of note, PGY3, Dr. Lawrence, unsuccessfully attempted to contact the family (pt's son, Tree) via phone to clarify PMHx and home medications.     In the ED vitals were T 97.6, HR 68, /66, RR 18, O2 96 RA.

## 2017-12-17 NOTE — PHYSICAL THERAPY INITIAL EVALUATION ADULT - DIAGNOSIS, PT EVAL
Pt s/p fall; pt presents with decreased strength Pt s/p fall; dx with hemarthrosis; pt presents with decreased strength

## 2017-12-17 NOTE — ED ADULT NURSE REASSESSMENT NOTE - NS ED NURSE REASSESS COMMENT FT1
Patient placed on enhance for safety.  He is agitated but can redirect and climbing out of bed.  PCA at bedside.

## 2017-12-17 NOTE — H&P ADULT - RS GEN PE MLT RESP DETAILS PC
airway patent/no rales/no rhonchi/no wheezes/clear to auscultation bilaterally/breath sounds equal/good air movement/respirations non-labored

## 2017-12-17 NOTE — PHYSICAL THERAPY INITIAL EVALUATION ADULT - GENERAL OBSERVATIONS, REHAB EVAL
Pt encountered in semisupine position, no distress, AxOx3, with +IV, and Left knee wrapped in ace bandage dry/intact.

## 2017-12-17 NOTE — PROGRESS NOTE ADULT - ATTENDING COMMENTS
I personally saw and evaluated the patient at bedside with family present. Family prefers to provide translation as they report  speaks a different dialect of [place] of [place]. Per patient, he reports pain is improved since arthrocentesis, but that it is still present in the left knee and leg. Only other complaint is constipation. Family also endorses a 20lb weight loss with decreased appetite that began upon patient moving from [place]. They deny any depressed mood or anhedonia, changes in sleep habits. They do report patient has also had chronic back pain, not relieved with Tylenol #3. Given weight loss and severe pain without any imaging, will check lumbosacral x-ray of back. Agree with holding Eliquis in the setting of possible fall vs spontaneous hemarthrosis. Patient does not sound to be in atrial fibrillation at this time. C/w metoprolol for rate-control and reported hx of CHF (unclear what type). Follow-up further results of arthrocentesis. Suspect likely dc to [facility]. I personally saw and evaluated the patient at bedside with family present. Family prefers to provide translation as they report  speaks a different dialect of [place]. Per patient, he reports pain is improved since arthrocentesis, but that it is still present in the left knee and leg. Only other complaint is constipation. Family also endorses a 20lb weight loss with decreased appetite that began upon patient moving from [place]. They deny any depressed mood or anhedonia, changes in sleep habits. They do report patient has also had chronic back pain, not relieved with Tylenol #3. Given weight loss and severe pain without any imaging, will check lumbosacral x-ray of back. Agree with holding Eliquis in the setting of possible fall vs spontaneous hemarthrosis. Patient does not sound to be in atrial fibrillation at this time. C/w metoprolol for rate-control and reported hx of CHF (unclear what type). Follow-up further results of arthrocentesis. Suspect likely dc to [facility]. Remaining care as above.

## 2017-12-17 NOTE — PROGRESS NOTE ADULT - PROBLEM SELECTOR PLAN 7
Hold Heparin for DVT ppx due to traumatic hemarthrosis; Hold Heparin for DVT ppx due to traumatic hemarthrosis. IMPROVE score 1. Family reports pt complains of lumbar back pain, chronic. Will obtain XR lumbar spine.  -XR lumbar spine.

## 2017-12-17 NOTE — PROGRESS NOTE ADULT - PROBLEM SELECTOR PLAN 5
BP not monitored at home per family. Pt is on amlodipine 5mg q24h and losartan.  -c/w amlodipine 5mg q24h  -c/w losartan 50mg q24h

## 2017-12-18 LAB
BASOPHILS # BLD AUTO: 0.03 K/UL — SIGNIFICANT CHANGE UP (ref 0–0.2)
BASOPHILS NFR BLD AUTO: 0.2 % — SIGNIFICANT CHANGE UP (ref 0–2)
BUN SERPL-MCNC: 27 MG/DL — HIGH (ref 7–23)
CALCIUM SERPL-MCNC: 8.6 MG/DL — SIGNIFICANT CHANGE UP (ref 8.4–10.5)
CHLORIDE SERPL-SCNC: 104 MMOL/L — SIGNIFICANT CHANGE UP (ref 98–107)
CO2 SERPL-SCNC: 21 MMOL/L — LOW (ref 22–31)
CREAT SERPL-MCNC: 0.91 MG/DL — SIGNIFICANT CHANGE UP (ref 0.5–1.3)
EOSINOPHIL # BLD AUTO: 0.02 K/UL — SIGNIFICANT CHANGE UP (ref 0–0.5)
EOSINOPHIL NFR BLD AUTO: 0.2 % — SIGNIFICANT CHANGE UP (ref 0–6)
GLUCOSE SERPL-MCNC: 107 MG/DL — HIGH (ref 70–99)
HCT VFR BLD CALC: 26.6 % — LOW (ref 39–50)
HGB BLD-MCNC: 8.6 G/DL — LOW (ref 13–17)
IMM GRANULOCYTES # BLD AUTO: 0.08 # — SIGNIFICANT CHANGE UP
IMM GRANULOCYTES NFR BLD AUTO: 0.7 % — SIGNIFICANT CHANGE UP (ref 0–1.5)
LYMPHOCYTES # BLD AUTO: 19.4 % — SIGNIFICANT CHANGE UP (ref 13–44)
LYMPHOCYTES # BLD AUTO: 2.36 K/UL — SIGNIFICANT CHANGE UP (ref 1–3.3)
MAGNESIUM SERPL-MCNC: 2 MG/DL — SIGNIFICANT CHANGE UP (ref 1.6–2.6)
MCHC RBC-ENTMCNC: 30.7 PG — SIGNIFICANT CHANGE UP (ref 27–34)
MCHC RBC-ENTMCNC: 32.3 % — SIGNIFICANT CHANGE UP (ref 32–36)
MCV RBC AUTO: 95 FL — SIGNIFICANT CHANGE UP (ref 80–100)
MONOCYTES # BLD AUTO: 1.64 K/UL — HIGH (ref 0–0.9)
MONOCYTES NFR BLD AUTO: 13.5 % — SIGNIFICANT CHANGE UP (ref 2–14)
NEUTROPHILS # BLD AUTO: 8.06 K/UL — HIGH (ref 1.8–7.4)
NEUTROPHILS NFR BLD AUTO: 66 % — SIGNIFICANT CHANGE UP (ref 43–77)
NRBC # FLD: 0 — SIGNIFICANT CHANGE UP
PHOSPHATE SERPL-MCNC: 4 MG/DL — SIGNIFICANT CHANGE UP (ref 2.5–4.5)
PLATELET # BLD AUTO: 222 K/UL — SIGNIFICANT CHANGE UP (ref 150–400)
PMV BLD: 10.2 FL — SIGNIFICANT CHANGE UP (ref 7–13)
POTASSIUM SERPL-MCNC: 4.5 MMOL/L — SIGNIFICANT CHANGE UP (ref 3.5–5.3)
POTASSIUM SERPL-SCNC: 4.5 MMOL/L — SIGNIFICANT CHANGE UP (ref 3.5–5.3)
RBC # BLD: 2.8 M/UL — LOW (ref 4.2–5.8)
RBC # FLD: 17 % — HIGH (ref 10.3–14.5)
REVIEW TO FOLLOW: YES — SIGNIFICANT CHANGE UP
SODIUM SERPL-SCNC: 138 MMOL/L — SIGNIFICANT CHANGE UP (ref 135–145)
WBC # BLD: 12.19 K/UL — HIGH (ref 3.8–10.5)
WBC # FLD AUTO: 12.19 K/UL — HIGH (ref 3.8–10.5)

## 2017-12-18 PROCEDURE — 99233 SBSQ HOSP IP/OBS HIGH 50: CPT | Mod: GC

## 2017-12-18 PROCEDURE — 93280 PM DEVICE PROGR EVAL DUAL: CPT | Mod: 26

## 2017-12-18 RX ORDER — FUROSEMIDE 40 MG
40 TABLET ORAL
Qty: 0 | Refills: 0 | Status: DISCONTINUED | OUTPATIENT
Start: 2017-12-19 | End: 2017-12-20

## 2017-12-18 RX ORDER — SENNA PLUS 8.6 MG/1
2 TABLET ORAL AT BEDTIME
Qty: 0 | Refills: 0 | Status: DISCONTINUED | OUTPATIENT
Start: 2017-12-18 | End: 2017-12-22

## 2017-12-18 RX ORDER — DOCUSATE SODIUM 100 MG
100 CAPSULE ORAL
Qty: 0 | Refills: 0 | Status: DISCONTINUED | OUTPATIENT
Start: 2017-12-18 | End: 2017-12-22

## 2017-12-18 RX ORDER — LIDOCAINE 4 G/100G
1 CREAM TOPICAL EVERY 24 HOURS
Qty: 0 | Refills: 0 | Status: DISCONTINUED | OUTPATIENT
Start: 2017-12-18 | End: 2017-12-22

## 2017-12-18 RX ORDER — FUROSEMIDE 40 MG
40 TABLET ORAL
Qty: 0 | Refills: 0 | Status: DISCONTINUED | OUTPATIENT
Start: 2017-12-18 | End: 2017-12-18

## 2017-12-18 RX ORDER — OXYCODONE HYDROCHLORIDE 5 MG/1
2.5 TABLET ORAL EVERY 8 HOURS
Qty: 0 | Refills: 0 | Status: DISCONTINUED | OUTPATIENT
Start: 2017-12-18 | End: 2017-12-22

## 2017-12-18 RX ORDER — POLYETHYLENE GLYCOL 3350 17 G/17G
17 POWDER, FOR SOLUTION ORAL DAILY
Qty: 0 | Refills: 0 | Status: DISCONTINUED | OUTPATIENT
Start: 2017-12-18 | End: 2017-12-22

## 2017-12-18 RX ORDER — FUROSEMIDE 40 MG
40 TABLET ORAL ONCE
Qty: 0 | Refills: 0 | Status: COMPLETED | OUTPATIENT
Start: 2017-12-18 | End: 2017-12-18

## 2017-12-18 RX ADMIN — LIDOCAINE 1 PATCH: 4 CREAM TOPICAL at 11:52

## 2017-12-18 RX ADMIN — LIDOCAINE 1 PATCH: 4 CREAM TOPICAL at 01:19

## 2017-12-18 RX ADMIN — Medication 650 MILLIGRAM(S): at 13:46

## 2017-12-18 RX ADMIN — Medication 650 MILLIGRAM(S): at 05:05

## 2017-12-18 RX ADMIN — Medication 650 MILLIGRAM(S): at 21:43

## 2017-12-18 RX ADMIN — Medication 650 MILLIGRAM(S): at 22:30

## 2017-12-18 RX ADMIN — AMLODIPINE BESYLATE 5 MILLIGRAM(S): 2.5 TABLET ORAL at 05:07

## 2017-12-18 RX ADMIN — Medication 25 MILLIGRAM(S): at 05:08

## 2017-12-18 RX ADMIN — ATORVASTATIN CALCIUM 10 MILLIGRAM(S): 80 TABLET, FILM COATED ORAL at 21:43

## 2017-12-18 RX ADMIN — Medication 1 TABLET(S): at 11:52

## 2017-12-18 RX ADMIN — Medication 650 MILLIGRAM(S): at 06:00

## 2017-12-18 RX ADMIN — POLYETHYLENE GLYCOL 3350 17 GRAM(S): 17 POWDER, FOR SOLUTION ORAL at 11:52

## 2017-12-18 RX ADMIN — Medication 100 MILLIGRAM(S): at 17:44

## 2017-12-18 RX ADMIN — Medication 325 MILLIGRAM(S): at 11:52

## 2017-12-18 RX ADMIN — SENNA PLUS 2 TABLET(S): 8.6 TABLET ORAL at 21:45

## 2017-12-18 RX ADMIN — Medication 25 MILLIGRAM(S): at 17:45

## 2017-12-18 RX ADMIN — LOSARTAN POTASSIUM 50 MILLIGRAM(S): 100 TABLET, FILM COATED ORAL at 05:07

## 2017-12-18 RX ADMIN — Medication 650 MILLIGRAM(S): at 14:46

## 2017-12-18 RX ADMIN — Medication 40 MILLIGRAM(S): at 17:44

## 2017-12-18 NOTE — CHART NOTE - NSCHARTNOTEFT_GEN_A_CORE
Care Model A Medicine Resident - Desat    I examined the pt for recorded SpO2 to 85% on room air, improved with NC O2 to 99% at 2L/min. On exam pt is initially in no respiratory distress comfortable on NC O2 2L/min. I removed oxygen and repeat SpO2 5 minutes later: sat was 86% and pt was tachypneic. On exam pt has bilateral crackles to mid-lung fields. On questioning family, pt had been admitted to [place] recently for CHF exacerbation and was discharged on Lasix. Lasix was stopped recently (? within past week, family unsure) by his cardiologist.     Plan: will restart Lasix at 40mg po q24h.   -Strict I/O's.  -expedite TTE    Des Storey  [facility] Medicine, PGY-2  [phone] / [phone] Care Model A Medicine Resident - Desat    I examined the pt for recorded SpO2 to 85% on room air, improved with NC O2 to 99% at 2L/min. On exam pt is initially in no respiratory distress comfortable on NC O2 2L/min. I removed oxygen and repeat SpO2 5 minutes later: sat was 86% and pt was tachypneic. On exam pt has bilateral crackles to mid-lung fields. On questioning family, pt had been admitted to [place] recently for CHF exacerbation and was discharged on Lasix. Lasix was stopped recently (? within past week, family unsure) by his cardiologist.     Plan: will restart Lasix at 40mg po q24h.   -Strict I/O's.  -expedite TTE    I also just received a fax from [facility] with records from his recent hospitalization. They are summarized below:  The pt presented to [place] on 11/6/2017 with 2 weeks of dyspnea on exertion and abnormal CXR 2 days prior to presentation. CXR was consistent with CHF cardiomegaly, . He received Lasix 80mg IVP x 1. Labs on 11/6/17: WBC 12.1, Hgb 10.1, Plts 254, INR 2.10, Na 141, K 3.7, CO2 27, BUN 34, SCr 1.3, CPK 36, trop-I <0.1, ,     Dr. Harman contacted the patient's cardiologist Dr. Donis.      Des Storey  [facility] A Medicine, PGY-2  [phone] / [phone] Care Model A Medicine Resident - Desat    I examined the pt for recorded SpO2 to 85% on room air, improved with NC O2 to 99% at 2L/min. On exam pt is initially in no respiratory distress comfortable on NC O2 2L/min. I removed oxygen and repeat SpO2 5 minutes later: sat was 86% and pt was tachypneic. On exam pt has bilateral crackles to mid-lung fields. On questioning family, pt had been admitted to [place] recently for CHF exacerbation and was discharged on Lasix. Lasix was stopped recently (? within past week, family unsure) by his cardiologist.     Plan: will restart Lasix at 40mg po q24h.   -Strict I/O's.  -expedite TTE    I also just received a fax from [facility] with limited records from his recent hospitalization for acute on chronic CHF exacerbation. They are summarized below:  The pt presented to [place] on 11/6/2017 with 2 weeks of dyspnea on exertion and abnormal CXR 2 days prior to presentation. CXR was consistent with CHF cardiomegaly, . He received Lasix 80mg IVP x 1. Labs on 11/6/17: WBC 12.1, Hgb 10.1, Plts 254, INR 2.10, Na 141, K 3.7, CO2 27, BUN 34, SCr 1.3, CPK 36, trop-I <0.1, ,     Dr. Harman contacted the patient's cardiologist Dr. Donis's office. He was last seen by this office in November 2017. In 2013 he had stress test with EF 53% showing infarct; they informed Dr. Harman that the patient has a history of CAD with medically-managed 90% D1 stenosis in 2013 on cath. No stents were placed. He has dual-ventricular pacemaker, paroxysmal afib history. In Nov 16, 2017 the pt was admitted for mechanical fall and dx with acute on chronic diastolic CHF with TTE EF 40-45% and grade I diastolic dysfunction. He was on Lasix 40mg po BID after discharge then per nephrology changed to Lasix 40mg am daily and Lasix 40mg pm EOD. This was then discontinued in November at follow-up visit with cardiology at which pt was hypotensive.     Des Storey  [facility], PGY-2  [phone] / [phone] Care Model A Medicine Resident - Desat    I examined the pt for recorded SpO2 to 85% on room air, improved with NC O2 to 99% at 2L/min. On exam pt is initially in no respiratory distress comfortable on NC O2 2L/min. I removed oxygen and repeat SpO2 5 minutes later: sat was 86% and pt was tachypneic. On exam pt has bilateral crackles to mid-lung fields. On questioning family, pt had been admitted to [place] recently for CHF exacerbation and was discharged on Lasix. Lasix was stopped recently (? within past week, family unsure) by his cardiologist.     Plan:   -Lasix 40mg IVP now  -Start Lasix 40mg po BID tomorrow   -Strict I/O's.  -expedite TTE    I also just received a fax from [facility] with limited records from his recent hospitalization for acute on chronic CHF exacerbation. They are summarized below:  The pt presented to [place] on 11/6/2017 with 2 weeks of dyspnea on exertion and abnormal CXR 2 days prior to presentation. CXR was consistent with CHF cardiomegaly, . He received Lasix 80mg IVP x 1. Labs on 11/6/17: WBC 12.1, Hgb 10.1, Plts 254, INR 2.10, Na 141, K 3.7, CO2 27, BUN 34, SCr 1.3, CPK 36, trop-I <0.1, ,     Dr. Harman contacted the patient's cardiologist Dr. Donis's office. He was last seen by this office in November 2017. In 2013 he had stress test with EF 53% showing infarct; they informed Dr. Harman that the patient has a history of CAD with medically-managed 90% D1 stenosis in 2013 on cath. No stents were placed. He has dual-ventricular pacemaker, paroxysmal afib history. Cardiologist also informed us on review of their records that on Nov 16, 2017 the pt was admitted for mechanical fall and dx with acute on chronic diastolic CHF with TTE EF 40-45% and grade I diastolic dysfunction. He was on Lasix 40mg po BID after discharge then per nephrology changed to Lasix 40mg am daily and Lasix 40mg pm EOD. This was later discontinued at follow-up visit with cardiology because the pt was hypotensive.     [facility], PGY-2  [phone] / [phone] Care Model A Medicine Resident - Desat    I examined the pt for recorded SpO2 to 85% on room air, improved with NC O2 to 99% at 2L/min. On exam pt is initially in no respiratory distress comfortable on NC O2 2L/min. I removed oxygen and repeat SpO2 5 minutes later: sat was 86% and pt was tachypneic. On exam pt has bilateral crackles to mid-lung fields. On questioning family, pt had been admitted to [place] recently for CHF exacerbation and was discharged on Lasix. Lasix was stopped recently (? within past week, family unsure) by his cardiologist.     Plan:   -Lasix 40mg IVP now  -Start Lasix 40mg po BID tomorrow   -Strict I/O's.  -expedite TTE    I also just received a fax from [facility] with limited records from his recent hospitalization for acute on chronic CHF exacerbation. They are summarized below:  The pt presented to [place] on 11/6/2017 with 2 weeks of dyspnea on exertion and abnormal CXR 2 days prior to presentation. CXR was consistent with CHF cardiomegaly, . He received Lasix 80mg IVP x 1. Labs on 11/6/17: WBC 12.1, Hgb 10.1, Plts 254, INR 2.10, Na 141, K 3.7, CO2 27, BUN 34, SCr 1.3, CPK 36, trop-I <0.1, ,     Dr. Harman contacted the patient's cardiologist Dr. Donis's office. He was last seen by this office in November 2017. In 2013 he had stress test with EF 53% showing infarct; they informed Dr. Harman that the patient has a history of CAD with medically-managed 90% D1 stenosis in 2013 on cath. No stents were placed. He has dual-ventricular pacemaker, paroxysmal afib history. Cardiology PA also informed us on review of their records that on Nov 16, 2017 the pt was admitted for mechanical fall and dx with acute on chronic diastolic CHF with TTE EF 40-45% and grade I diastolic dysfunction. He was on Lasix 40mg po BID after discharge then per nephrology changed to Lasix 40mg am daily and Lasix 40mg pm EOD. This was later discontinued at follow-up visit with cardiology because the pt was hypotensive.     Des Storey  Care Model A Medicine, PGY-2  [phone] / [phone]

## 2017-12-18 NOTE — PROGRESS NOTE ADULT - ATTENDING COMMENTS
I personally saw and evaluated the patient at bedside. Patient agitated overnight and received Zyprexa 5mg IM x1 wo effect but then c/o pain to a Grenadian . He then calmed down after receiving Oxycodone IR 5mg po x1. This morning patient is arousable. He does not understand the Italian Gren[place] interpreters provided by [employer] interpreters. Examiner spoke to the patient in Romanian which he was able to understand patient. Patient denies any pain at this time. Lungs CTAB on my exam and heart is RRR. Left leg still with some swelling but improved from yesterday. No ecchymoses of left knee. Lumbar x-ray performed but awaiting official read. Will place lidocaine patch to the back with Oxycodone IR 2.5mg po Q8hrs PRN severe pain. Will f/u dietary consultation regarding weight loss. Will continue to hold Eliquis in the setting of possible fall vs spontaneous hemarthrosis. Patient does not sound to be in atrial fibrillation at this time. C/w metoprolol for rate-control and reported hx of CHF (unclear what type). Awaiting results from [facility]. Will interrogate patient's PPM. Add bowel regimen as patient has been having constipation. I personally saw and evaluated the patient at bedside. Patient agitated overnight and received Zyprexa 5mg IM x1 wo effect but then c/o pain to a Albanian . He then calmed down after receiving Oxycodone IR 5mg po x1. This morning patient is arousable. He does not understand the Azerbaijani Albanian interpreters provided by [employer] interpreters. Examiner spoke to the patient in Estonian which he was able to understand patient. Patient denies any pain at this time. Lungs with basilar crackles on my exam and heart is RRR. Left leg still with some swelling but improved from yesterday. No ecchymoses of left knee. Lumbar x-ray performed but awaiting official read. Will place lidocaine patch to the back with Oxycodone IR 2.5mg po Q8hrs PRN severe pain. Will f/u dietary consultation regarding weight loss. Will continue to hold Eliquis in the setting of possible fall vs spontaneous hemarthrosis. Patient does not sound to be in atrial fibrillation at this time. C/w metoprolol for rate-control and reported hx of CHF (unclear what type). Awaiting results from [place]. Will interrogate patient's PPM. Will resume lasix given crackles on pulmonary exam. Add bowel regimen as patient has been having constipation.

## 2017-12-18 NOTE — PROGRESS NOTE ADULT - PROBLEM SELECTOR PLAN 7
Family reports pt complains of lumbar back pain, chronic. Will obtain XR lumbar spine.  - XR lumbar spine ordered. Family reports pt complains of lumbar back pain, chronic. Will obtain XR lumbar spine.  - XR lumbar spine shows T1 compression fracture, degenerative joint changes, osteopenia with no discrete lytic or blastic lesions.

## 2017-12-18 NOTE — PROGRESS NOTE ADULT - PROBLEM SELECTOR PLAN 2
Pt and family deny trauma while patient ambulated to bathroom.   - EKG v-paced  - PT/OT  - Fall precaution  - Consider PPM assessment.  - consider CT Head Pt and family deny trauma while patient ambulated to bathroom.   - EKG v-paced  - PT/OT  - Fall precaution  - Consider PPM assessment. Pt and family deny trauma while patient ambulated to bathroom.   - EKG v-paced  - PT/OT  - Fall precaution  - Considering PPM assessment.  - obtain [place] records for cardiology workup and PPM interrogation

## 2017-12-18 NOTE — PROGRESS NOTE ADULT - SUBJECTIVE AND OBJECTIVE BOX
Patient is a 89y old  Male who presents with a chief complaint of Lt knee pain (17 Dec 2017 05:00)      SUBJECTIVE / OVERNIGHT EVENTS: Pt was reported to be agitated last night around 10 PM and received Zyprexa x1. Nursing used  line for [employer] and found out pt was in severe pain. Oxycodone IR 5 mg PO relieved pain and pt was able to sleep. Pt was reported to be agitated w/ shakes again at 5:30 am but fell asleep when night float saw pt. This am pt is arousable, but not amenable to questioning or exam. Pt shouting out single word/phrase. Two And[place]ran [employer] interpreters unable to understand pt. Pt was resistant to examination and pushed examiner away, but calmed down and went to sleep after left alone. Will attempt to speak with pt and examine later with [employer]  or family member.     MEDICATIONS  (STANDING):  acetaminophen   Tablet. 650 milliGRAM(s) Oral every 8 hours  amLODIPine   Tablet 5 milliGRAM(s) Oral daily  atorvastatin 10 milliGRAM(s) Oral at bedtime  ferrous    sulfate 325 milliGRAM(s) Oral daily  lidocaine   Patch 1 Patch Transdermal daily  losartan 50 milliGRAM(s) Oral daily  metoprolol     tartrate 25 milliGRAM(s) Oral two times a day  multivitamin 1 Tablet(s) Oral daily    MEDICATIONS  (PRN):      Vital Signs Last 24 Hrs  T(C): 37.1 (18 Dec 2017 04:57), Max: 37.1 (18 Dec 2017 04:57)  T(F): 98.7 (18 Dec 2017 04:57), Max: 98.7 (18 Dec 2017 04:57)  HR: 101 (18 Dec 2017 04:57) (74 - 101)  BP: 160/75 (18 Dec 2017 04:57) (137/65 - 160/75)  BP(mean): --  RR: 19 (18 Dec 2017 04:57) (18 - 20)  SpO2: 97% (18 Dec 2017 04:57) (94% - 97%)    CAPILLARY BLOOD GLUCOSE          I&O's Summary        PHYSICAL EXAM:  GENERAL: NAD lying flat in bed, asleep, then awake but sleepy and annoyed  HEAD:  Atraumatic, Normocephalic  EYES: arcus senilis, conj clear,   ENMT:   NECK:   NERVOUS SYSTEM: unable to assess  PSYCHIATRIC: unable to assess  CHEST/LUNG: unable to assess  HEART: unable to assess  ABDOMEN: Soft, Nontender, Nondistended; Bowel sounds present  EXTREMITIES:  LLE swelling L knee, ACE wrap undone. L knee not erythematous or warm to touch  SKIN: no visible abrasions, bruising L knee      LABS:                        8.6    12.19 )-----------( 222      ( 18 Dec 2017 06:40 )             26.6     12-18    138  |  104  |  27<H>  ----------------------------<  107<H>  4.5   |  21<L>  |  0.91    Ca    8.6      18 Dec 2017 06:40  Phos  4.0     12-  Mg     2.0         TPro  6.9  /  Alb  3.7  /  TBili  0.4  /  DBili  x   /  AST  25  /  ALT  10  /  AlkPhos  47  12-16    LIVER FUNCTIONS - ( 16 Dec 2017 21:19 )  Alb: 3.7 g/dL / Pro: 6.9 g/dL / ALK PHOS: 47 u/L / ALT: 10 u/L / AST: 25 u/L / GGT: x           PT/INR - ( 16 Dec 2017 21:19 )   PT: 15.8 SEC;   INR: 1.37          PTT - ( 16 Dec 2017 21:19 )  PTT:35.3 SEC      Urinalysis Basic - ( 17 Dec 2017 18:30 )    Color: YELLOW / Appearance: CLEAR / S.024 / pH: 5.5  Gluc: NEGATIVE / Ketone: TRACE  / Bili: NEGATIVE / Urobili: NORMAL mg/dL   Blood: NEGATIVE / Protein: 30 mg/dL / Nitrite: NEGATIVE   Leuk Esterase: NEGATIVE / RBC: 2-5 / WBC 0-2   Sq Epi: OCC / Non Sq Epi: x / Bacteria: x          RADIOLOGY & ADDITIONAL TESTS:    Imaging Personally Reviewed:     Consultant(s) Notes Reviewed:     Care Discussed with Consultants/Other Providers:     Clemente Harman MD  PGY-1 | Internal Medicine  [phone] / [phone] Patient is a 89y old  Male who presents with a chief complaint of Lt knee pain (17 Dec 2017 05:00)      SUBJECTIVE / OVERNIGHT EVENTS: Pt was reported to be agitated last night around 10 PM and received Zyprexa x1. Nursing used  line for [employer] and found out pt was in severe pain. Oxycodone IR 5 mg PO relieved pain and pt was able to sleep. Pt was reported to be agitated w/ shakes again at 5:30 am but fell asleep when night float saw pt. This am pt is arousable, but not amenable to questioning or exam. Pt shouting out single word/phrase. Two [place] [employer] interpreters unable to understand pt. Pt was resistant to examination and pushed examiner away, but calmed down and went to sleep after left alone. Will attempt to speak with pt and examine later with [employer]  or family member.    MEDICATIONS  (STANDING):  acetaminophen   Tablet. 650 milliGRAM(s) Oral every 8 hours  amLODIPine   Tablet 5 milliGRAM(s) Oral daily  atorvastatin 10 milliGRAM(s) Oral at bedtime  ferrous    sulfate 325 milliGRAM(s) Oral daily  lidocaine   Patch 1 Patch Transdermal daily  losartan 50 milliGRAM(s) Oral daily  metoprolol     tartrate 25 milliGRAM(s) Oral two times a day  multivitamin 1 Tablet(s) Oral daily    MEDICATIONS  (PRN):      Vital Signs Last 24 Hrs  T(C): 37.1 (18 Dec 2017 04:57), Max: 37.1 (18 Dec 2017 04:57)  T(F): 98.7 (18 Dec 2017 04:57), Max: 98.7 (18 Dec 2017 04:57)  HR: 101 (18 Dec 2017 04:57) (74 - 101)  BP: 160/75 (18 Dec 2017 04:57) (137/65 - 160/75)  BP(mean): --  RR: 19 (18 Dec 2017 04:57) (18 - 20)  SpO2: 97% (18 Dec 2017 04:57) (94% - 97%)    CAPILLARY BLOOD GLUCOSE          I&O's Summary        PHYSICAL EXAM:  GENERAL: NAD lying flat in bed, asleep, then awake but sleepy and annoyed  HEAD:  Atraumatic, Normocephalic  EYES: arcus senilis, conj clear,   ENMT:   NECK:   NERVOUS SYSTEM: unable to assess  PSYCHIATRIC: unable to assess  CHEST/LUNG: unable to assess  HEART: unable to assess  ABDOMEN: Soft, Nontender, Nondistended; Bowel sounds present  EXTREMITIES:  LLE swelling L knee, ACE wrap undone. L knee not erythematous or warm to touch  SKIN: no visible abrasions, bruising L knee      LABS:                        8.6    12.19 )-----------( 222      ( 18 Dec 2017 06:40 )             26.6     12-18    138  |  104  |  27<H>  ----------------------------<  107<H>  4.5   |  21<L>  |  0.91    Ca    8.6      18 Dec 2017 06:40  Phos  4.0     12-  Mg     2.0         TPro  6.9  /  Alb  3.7  /  TBili  0.4  /  DBili  x   /  AST  25  /  ALT  10  /  AlkPhos  47  12-16    LIVER FUNCTIONS - ( 16 Dec 2017 21:19 )  Alb: 3.7 g/dL / Pro: 6.9 g/dL / ALK PHOS: 47 u/L / ALT: 10 u/L / AST: 25 u/L / GGT: x           PT/INR - ( 16 Dec 2017 21:19 )   PT: 15.8 SEC;   INR: 1.37          PTT - ( 16 Dec 2017 21:19 )  PTT:35.3 SEC      Urinalysis Basic - ( 17 Dec 2017 18:30 )    Color: YELLOW / Appearance: CLEAR / S.024 / pH: 5.5  Gluc: NEGATIVE / Ketone: TRACE  / Bili: NEGATIVE / Urobili: NORMAL mg/dL   Blood: NEGATIVE / Protein: 30 mg/dL / Nitrite: NEGATIVE   Leuk Esterase: NEGATIVE / RBC: 2-5 / WBC 0-2   Sq Epi: OCC / Non Sq Epi: x / Bacteria: x          RADIOLOGY & ADDITIONAL TESTS:    Imaging Personally Reviewed:     Consultant(s) Notes Reviewed:     Care Discussed with Consultants/Other Providers:     Clemente Harman MD  PGY-1 | Internal Medicine  [phone] / [phone] Patient is a 89y old  Male who presents with a chief complaint of Lt knee pain (17 Dec 2017 05:00)      SUBJECTIVE / OVERNIGHT EVENTS: Pt was reported to be agitated last night around 10 PM and received Zyprexa x1. Nursing used  line for [employer] and found out pt was in severe pain. Oxycodone IR 5 mg PO relieved pain and pt was able to sleep. Pt was reported to be agitated w/ shakes again at 5:30 am but fell asleep when night float saw pt. This am pt is arousable, but not amenable to questioning or exam. Pt shouting out single word/phrase. Two [place] [employer] interpreters unable to understand pt. Pt was resistant to examination and pushed examiner away, but calmed down and went to sleep after left alone. Will attempt to speak with pt and examine later with [employer]  or family member. Later in the am, pt was awake and said he was feeling so-so.     MEDICATIONS  (STANDING):  acetaminophen   Tablet. 650 milliGRAM(s) Oral every 8 hours  amLODIPine   Tablet 5 milliGRAM(s) Oral daily  atorvastatin 10 milliGRAM(s) Oral at bedtime  ferrous    sulfate 325 milliGRAM(s) Oral daily  lidocaine   Patch 1 Patch Transdermal daily  losartan 50 milliGRAM(s) Oral daily  metoprolol     tartrate 25 milliGRAM(s) Oral two times a day  multivitamin 1 Tablet(s) Oral daily    MEDICATIONS  (PRN):      Vital Signs Last 24 Hrs  T(C): 37.1 (18 Dec 2017 04:57), Max: 37.1 (18 Dec 2017 04:57)  T(F): 98.7 (18 Dec 2017 04:57), Max: 98.7 (18 Dec 2017 04:57)  HR: 101 (18 Dec 2017 04:57) (74 - 101)  BP: 160/75 (18 Dec 2017 04:57) (137/65 - 160/75)  BP(mean): --  RR: 19 (18 Dec 2017 04:57) (18 - 20)  SpO2: 97% (18 Dec 2017 04:57) (94% - 97%)    CAPILLARY BLOOD GLUCOSE          I&O's Summary        PHYSICAL EXAM:  GENERAL: NAD lying flat in bed, asleep, then awake but sleepy and annoyed  HEAD:  Atraumatic, Normocephalic  EYES: arcus senilis, conj clear,   ENMT: MMM, patent airway  CHEST/LUNG: b/l rales up to midlung  HEART: irregularly irregulary  ABDOMEN: Soft, Nontender, Nondistended; Bowel sounds present  EXTREMITIES:  LLE swelling L knee, ACE wrap undone. L knee not erythematous or warm to touch  NERVOUS SYSTEM: unable to assess  PSYCHIATRIC: unable to assess  SKIN: no visible abrasions, edematous L knee       LABS:                        8.6    12.19 )-----------( 222      ( 18 Dec 2017 06:40 )             26.6     12-18    138  |  104  |  27<H>  ----------------------------<  107<H>  4.5   |  21<L>  |  0.91    Ca    8.6      18 Dec 2017 06:40  Phos  4.0     12-18  Mg     2.0     12-18    TPro  6.9  /  Alb  3.7  /  TBili  0.4  /  DBili  x   /  AST  25  /  ALT  10  /  AlkPhos  47  12-16    LIVER FUNCTIONS - ( 16 Dec 2017 21:19 )  Alb: 3.7 g/dL / Pro: 6.9 g/dL / ALK PHOS: 47 u/L / ALT: 10 u/L / AST: 25 u/L / GGT: x           PT/INR - ( 16 Dec 2017 21:19 )   PT: 15.8 SEC;   INR: 1.37          PTT - ( 16 Dec 2017 21:19 )  PTT:35.3 SEC      Urinalysis Basic - ( 17 Dec 2017 18:30 )    Color: YELLOW / Appearance: CLEAR / S.024 / pH: 5.5  Gluc: NEGATIVE / Ketone: TRACE  / Bili: NEGATIVE / Urobili: NORMAL mg/dL   Blood: NEGATIVE / Protein: 30 mg/dL / Nitrite: NEGATIVE   Leuk Esterase: NEGATIVE / RBC: 2-5 / WBC 0-2   Sq Epi: OCC / Non Sq Epi: x / Bacteria: x          RADIOLOGY & ADDITIONAL TESTS:    Imaging Personally Reviewed:     Consultant(s) Notes Reviewed:     Care Discussed with Consultants/Other Providers:     Clemente Harman MD  PGY-1 | Internal Medicine  [phone] / [phone]

## 2017-12-18 NOTE — PROGRESS NOTE ADULT - ASSESSMENT
89yo Male Hx CHF (unknown EF), atrial fibrillation (on Eliquis) s/p PPM 5-6 years ago at [place], OA, prostate cancer s/p seed implants and radiation therapy 10-15 years ago, HTN, baseline A&O1-2 (ambulates with walker) a/w Lt knee pain due to traumatic large Lt knee effusion in the context of recent mechanical fall August 2017; Arthrocentesis c/w hemarthrosis; Also found to be dehydrated and anemic.

## 2017-12-18 NOTE — PROGRESS NOTE ADULT - SUBJECTIVE AND OBJECTIVE BOX
ELECTROPHYSIOLOGY  Device Interrogation Performed                                  Date/Time:17 5:50pm  : Placeword dual chamber pM      Model: Sheffield K 173  Mode: DDDR    Rate:   bpm                                                                                                  Total V pacin%, VP46%                                                                                                                                              Atrial Lead:  P wave amplitude: 2.1      mv          Impedence: 369         Ohms      Threshold:  0.9              V@  0.5           ms      Ventricular Lead(s):  RV Lead: R wave amplitude: 24.1    mv          Impedence: 404     Ohms      Threshold: 0.9      V@  0.5           ms   LV Lead:  R wave amplitude:                          mv          Impedence:                   Ohms      Threshold:                V@             ms     Battery Status:   X                  Good                ELLE                     EOL    Underlying Rhythm:   2:1 AV block with VR 30s-40s    Events/Observation: One episode of NSVT (13 beats) on 12/3/17 @166 bpm, Episodes of atrial fibrillation with controlled VR on 17. AC on hold now     Impression/Plan:  Normal PPM function.   Normal sensing and pacing via iterative testing. Good battery status. Excellent threshold capture.  No reprogramming.   No events corresponding to this admission

## 2017-12-18 NOTE — PROGRESS NOTE ADULT - PROBLEM SELECTOR PLAN 6
Monitor how well pt taking po. Holding IV fluids 2/2 history of CHF. Monitor how pt's PO intake. Holding IV fluids 2/2 history of CHF.

## 2017-12-18 NOTE — PROGRESS NOTE ADULT - PROBLEM SELECTOR PLAN 4
Per family patient is on Eliquis for atrial fibrillation s/p PPM placement 5-6 years ago for atrial fibrillation. Currently rate controlled.   - holding Eliquis 5mg BID 2/2 hemarthrosis.  - c/w metoprolol tartrate 25 mg BID PO Per family patient is on Eliquis for atrial fibrillation s/p PPM placement 5-6 years ago for atrial fibrillation. Currently rate controlled. [id number] score 4.   - holding Eliquis 5mg BID 2/2 hemarthrosis.  - c/w metoprolol tartrate 25 mg BID PO

## 2017-12-18 NOTE — PROGRESS NOTE ADULT - PROBLEM SELECTOR PLAN 3
Pt was diagnosed with CHF at [facility] in October 2017 per family, started on Lasix 40mg po q24h, held 2 weeks ago by his cardiologist as the dose was thought to be too high, per family.  - Holding Lasix  - daily weights  - monitor volume status.   - TTE for baseline.  - f/u [facility] records.

## 2017-12-18 NOTE — PROGRESS NOTE ADULT - PROBLEM SELECTOR PLAN 1
Family reports that pt  woke up in the AM with painful L knee, no clear evidence of trauma. Arthrocentesis showed high RBCs, no crystals, negative gram stain, WBCs. likely 2/2 trauma in the setting of a/c.  - Holding Eliquis for now  -Gram stain NGTD, f/u cultures  -Orthopedics recommendations appreciated. Family reports that pt  woke up in the AM with painful L knee, no clear evidence of trauma. Arthrocentesis showed high RBCs, no crystals, negative gram stain, WBCs. likely 2/2 trauma in the setting of a/c.  - Holding Eliquis for now  - Gram stain NGTD, f/u cultures  - Orthopedics recommendations appreciated.  - Oxycodone IR 2.5 mg PRN Q6 for pain control  - iStop reference #[id number]; pt only had one prescription for acetaminophen-codeine #3 for 10 days in past 12 months.

## 2017-12-19 DIAGNOSIS — I50.9 HEART FAILURE, UNSPECIFIED: ICD-10-CM

## 2017-12-19 LAB
BUN SERPL-MCNC: 30 MG/DL — HIGH (ref 7–23)
BUN SERPL-MCNC: 32 MG/DL — HIGH (ref 7–23)
CALCIUM SERPL-MCNC: 8.5 MG/DL — SIGNIFICANT CHANGE UP (ref 8.4–10.5)
CALCIUM SERPL-MCNC: 8.7 MG/DL — SIGNIFICANT CHANGE UP (ref 8.4–10.5)
CHLORIDE SERPL-SCNC: 101 MMOL/L — SIGNIFICANT CHANGE UP (ref 98–107)
CHLORIDE SERPL-SCNC: 102 MMOL/L — SIGNIFICANT CHANGE UP (ref 98–107)
CO2 SERPL-SCNC: 24 MMOL/L — SIGNIFICANT CHANGE UP (ref 22–31)
CO2 SERPL-SCNC: 26 MMOL/L — SIGNIFICANT CHANGE UP (ref 22–31)
CREAT SERPL-MCNC: 1.14 MG/DL — SIGNIFICANT CHANGE UP (ref 0.5–1.3)
CREAT SERPL-MCNC: 1.22 MG/DL — SIGNIFICANT CHANGE UP (ref 0.5–1.3)
GLUCOSE SERPL-MCNC: 147 MG/DL — HIGH (ref 70–99)
GLUCOSE SERPL-MCNC: 98 MG/DL — SIGNIFICANT CHANGE UP (ref 70–99)
HCT VFR BLD CALC: 28.3 % — LOW (ref 39–50)
HGB BLD-MCNC: 9.2 G/DL — LOW (ref 13–17)
MCHC RBC-ENTMCNC: 31.2 PG — SIGNIFICANT CHANGE UP (ref 27–34)
MCHC RBC-ENTMCNC: 32.5 % — SIGNIFICANT CHANGE UP (ref 32–36)
MCV RBC AUTO: 95.9 FL — SIGNIFICANT CHANGE UP (ref 80–100)
NRBC # FLD: 0 — SIGNIFICANT CHANGE UP
PLATELET # BLD AUTO: 253 K/UL — SIGNIFICANT CHANGE UP (ref 150–400)
PMV BLD: 10.4 FL — SIGNIFICANT CHANGE UP (ref 7–13)
POTASSIUM SERPL-MCNC: 3.6 MMOL/L — SIGNIFICANT CHANGE UP (ref 3.5–5.3)
POTASSIUM SERPL-MCNC: 4.4 MMOL/L — SIGNIFICANT CHANGE UP (ref 3.5–5.3)
POTASSIUM SERPL-SCNC: 3.6 MMOL/L — SIGNIFICANT CHANGE UP (ref 3.5–5.3)
POTASSIUM SERPL-SCNC: 4.4 MMOL/L — SIGNIFICANT CHANGE UP (ref 3.5–5.3)
RBC # BLD: 2.95 M/UL — LOW (ref 4.2–5.8)
RBC # FLD: 16.9 % — HIGH (ref 10.3–14.5)
SODIUM SERPL-SCNC: 139 MMOL/L — SIGNIFICANT CHANGE UP (ref 135–145)
SODIUM SERPL-SCNC: 141 MMOL/L — SIGNIFICANT CHANGE UP (ref 135–145)
WBC # BLD: 14.9 K/UL — HIGH (ref 3.8–10.5)
WBC # FLD AUTO: 14.9 K/UL — HIGH (ref 3.8–10.5)

## 2017-12-19 PROCEDURE — 99233 SBSQ HOSP IP/OBS HIGH 50: CPT

## 2017-12-19 RX ORDER — POTASSIUM CHLORIDE 20 MEQ
20 PACKET (EA) ORAL ONCE
Qty: 0 | Refills: 0 | Status: COMPLETED | OUTPATIENT
Start: 2017-12-19 | End: 2017-12-19

## 2017-12-19 RX ADMIN — Medication 40 MILLIGRAM(S): at 06:25

## 2017-12-19 RX ADMIN — Medication 1 TABLET(S): at 11:53

## 2017-12-19 RX ADMIN — Medication 650 MILLIGRAM(S): at 14:45

## 2017-12-19 RX ADMIN — ATORVASTATIN CALCIUM 10 MILLIGRAM(S): 80 TABLET, FILM COATED ORAL at 21:42

## 2017-12-19 RX ADMIN — Medication 325 MILLIGRAM(S): at 11:53

## 2017-12-19 RX ADMIN — Medication 650 MILLIGRAM(S): at 14:09

## 2017-12-19 RX ADMIN — LIDOCAINE 1 PATCH: 4 CREAM TOPICAL at 11:52

## 2017-12-19 RX ADMIN — LIDOCAINE 1 PATCH: 4 CREAM TOPICAL at 00:51

## 2017-12-19 RX ADMIN — Medication 20 MILLIEQUIVALENT(S): at 11:53

## 2017-12-19 RX ADMIN — Medication 25 MILLIGRAM(S): at 17:48

## 2017-12-19 RX ADMIN — Medication 650 MILLIGRAM(S): at 22:17

## 2017-12-19 RX ADMIN — Medication 650 MILLIGRAM(S): at 07:00

## 2017-12-19 RX ADMIN — Medication 40 MILLIGRAM(S): at 17:47

## 2017-12-19 RX ADMIN — OXYCODONE HYDROCHLORIDE 2.5 MILLIGRAM(S): 5 TABLET ORAL at 02:32

## 2017-12-19 RX ADMIN — AMLODIPINE BESYLATE 5 MILLIGRAM(S): 2.5 TABLET ORAL at 06:25

## 2017-12-19 RX ADMIN — Medication 650 MILLIGRAM(S): at 21:42

## 2017-12-19 RX ADMIN — Medication 100 MILLIGRAM(S): at 06:25

## 2017-12-19 RX ADMIN — OXYCODONE HYDROCHLORIDE 2.5 MILLIGRAM(S): 5 TABLET ORAL at 03:30

## 2017-12-19 RX ADMIN — SENNA PLUS 2 TABLET(S): 8.6 TABLET ORAL at 21:42

## 2017-12-19 RX ADMIN — Medication 650 MILLIGRAM(S): at 06:24

## 2017-12-19 RX ADMIN — Medication 25 MILLIGRAM(S): at 06:25

## 2017-12-19 RX ADMIN — LOSARTAN POTASSIUM 50 MILLIGRAM(S): 100 TABLET, FILM COATED ORAL at 06:25

## 2017-12-19 NOTE — PROGRESS NOTE ADULT - SUBJECTIVE AND OBJECTIVE BOX
Patient is a 89y old  Male who presents with a chief complaint of Lt knee pain (17 Dec 2017 05:00)      SUBJECTIVE / OVERNIGHT EVENTS: Pt was agitated overnight but it was due to pain when staff used  line. Pain resolved with oxycodone 2.5 mg. Pt seen and examined at the bedside with [facility]  on the phone. Pt states that he feels fine this am except there was pain overnight which is now better. Pt states his breathing is fine. Denies CP, SOB, fevers, abd pain, dysuria.    MEDICATIONS  (STANDING):  acetaminophen   Tablet. 650 milliGRAM(s) Oral every 8 hours  amLODIPine   Tablet 5 milliGRAM(s) Oral daily  atorvastatin 10 milliGRAM(s) Oral at bedtime  docusate sodium 100 milliGRAM(s) Oral two times a day  ferrous    sulfate 325 milliGRAM(s) Oral daily  furosemide    Tablet 40 milliGRAM(s) Oral two times a day  lidocaine   Patch 1 Patch Transdermal daily  lidocaine   Patch 1 Patch Transdermal every 24 hours  losartan 50 milliGRAM(s) Oral daily  metoprolol     tartrate 25 milliGRAM(s) Oral two times a day  multivitamin 1 Tablet(s) Oral daily  polyethylene glycol 3350 17 Gram(s) Oral daily  senna 2 Tablet(s) Oral at bedtime    MEDICATIONS  (PRN):  oxyCODONE    IR 2.5 milliGRAM(s) Oral every 8 hours PRN Severe Pain (7 - 10)      Vital Signs Last 24 Hrs  T(C): 37.2 (19 Dec 2017 06:25), Max: 37.2 (19 Dec 2017 06:25)  T(F): 98.9 (19 Dec 2017 06:25), Max: 98.9 (19 Dec 2017 06:25)  HR: 88 (19 Dec 2017 06:25) (76 - 91)  BP: 115/94 (19 Dec 2017 06:25) (115/94 - 148/63)  BP(mean): --  RR: 20 (19 Dec 2017 06:25) (19 - 24)  SpO2: 98% (19 Dec 2017 06:25) (95% - 98%)    CAPILLARY BLOOD GLUCOSE          I&O's Summary    18 Dec 2017 07:01  -  19 Dec 2017 07:00  --------------------------------------------------------  IN: 0 mL / OUT: 200 mL / NET: -200 mL      PHYSICAL EXAM:  GENERAL: NAD sleeping on side curled up in bed; responded appropriately through  line  HEAD:  Atraumatic, Normocephalic  EYES: arcus senilis, conj clear  ENMT: MMM, patent airway  CHEST/LUNG: b/l rales up to midlung  HEART: irregularly irregular, no murmurs, rubs, gallops  ABDOMEN: Soft, Nontender, Nondistended; Bowel sounds present  EXTREMITIES:  LLE swelling L knee. L knee not erythematous or warm to touch  NERVOUS SYSTEM: follows commands, moves all four extremities  PSYCHIATRIC: AOx1, normal mood and affect  SKIN: no visible abrasions, edematous L knee       LABS:                        9.2    14.90 )-----------( 253      ( 19 Dec 2017 06:10 )             28.3     12-19    141  |  101  |  30<H>  ----------------------------<  98  3.6   |  24  |  1.14    Ca    8.5      19 Dec 2017 06:10  Phos  4.0     12-18  Mg     2.0     12-18              Urinalysis Basic - ( 17 Dec 2017 18:30 )    Color: YELLOW / Appearance: CLEAR / S.024 / pH: 5.5  Gluc: NEGATIVE / Ketone: TRACE  / Bili: NEGATIVE / Urobili: NORMAL mg/dL   Blood: NEGATIVE / Protein: 30 mg/dL / Nitrite: NEGATIVE   Leuk Esterase: NEGATIVE / RBC: 2-5 / WBC 0-2   Sq Epi: OCC / Non Sq Epi: x / Bacteria: x          RADIOLOGY & ADDITIONAL TESTS:    Imaging Personally Reviewed:     Consultant(s) Notes Reviewed:     Care Discussed with Consultants/Other Providers:     Clemente Harman MD  PGY-1 | Internal Medicine  [phone] / [phone] Patient is a 89y old  Male who presents with a chief complaint of Lt knee pain (17 Dec 2017 05:00)      SUBJECTIVE / OVERNIGHT EVENTS: Pt was agitated overnight but it was due to pain when staff used  line. Pain resolved with oxycodone 2.5 mg. Pt seen and examined at the bedside with [facility]  on the phone. Pt states that he feels fine this am except there was pain overnight which is now better. Pt states his breathing is fine. Denies CP, SOB, fevers, abd pain, dysuria.    MEDICATIONS  (STANDING):  acetaminophen   Tablet. 650 milliGRAM(s) Oral every 8 hours  amLODIPine   Tablet 5 milliGRAM(s) Oral daily  atorvastatin 10 milliGRAM(s) Oral at bedtime  docusate sodium 100 milliGRAM(s) Oral two times a day  ferrous    sulfate 325 milliGRAM(s) Oral daily  furosemide    Tablet 40 milliGRAM(s) Oral two times a day  lidocaine   Patch 1 Patch Transdermal daily  lidocaine   Patch 1 Patch Transdermal every 24 hours  losartan 50 milliGRAM(s) Oral daily  metoprolol     tartrate 25 milliGRAM(s) Oral two times a day  multivitamin 1 Tablet(s) Oral daily  polyethylene glycol 3350 17 Gram(s) Oral daily  senna 2 Tablet(s) Oral at bedtime    MEDICATIONS  (PRN):  oxyCODONE    IR 2.5 milliGRAM(s) Oral every 8 hours PRN Severe Pain (7 - 10)      Vital Signs Last 24 Hrs  T(C): 37.2 (19 Dec 2017 06:25), Max: 37.2 (19 Dec 2017 06:25)  T(F): 98.9 (19 Dec 2017 06:25), Max: 98.9 (19 Dec 2017 06:25)  HR: 88 (19 Dec 2017 06:25) (76 - 91)  BP: 115/94 (19 Dec 2017 06:25) (115/94 - 148/63)  BP(mean): --  RR: 20 (19 Dec 2017 06:25) (19 - 24)  SpO2: 98% (19 Dec 2017 06:25) (95% - 98%)    CAPILLARY BLOOD GLUCOSE    I&O's Summary    18 Dec 2017 07:01  -  19 Dec 2017 07:00  --------------------------------------------------------  IN: 0 mL / OUT: 200 mL / NET: -200 mL      PHYSICAL EXAM:  GENERAL: NAD sleeping on side curled up in bed; responded appropriately through  line  HEAD:  Atraumatic, Normocephalic  EYES: arcus senilis, conj clear  ENMT: MMM, patent airway  CHEST/LUNG: b/l rales up to midlung  HEART: irregularly irregular, no murmurs, rubs, gallops  ABDOMEN: Soft, Nontender, Nondistended; Bowel sounds present  EXTREMITIES:  L knee swelling - improved from previous - + effusion present. L knee not erythematous or warm to touch  NERVOUS SYSTEM: follows commands, moves all four extremities  PSYCHIATRIC: AOx1, normal mood and affect  SKIN: no visible abrasions, edematous L knee       LABS:                        9.2    14.90 )-----------( 253      ( 19 Dec 2017 06:10 )             28.3     12-19    141  |  101  |  30<H>  ----------------------------<  98  3.6   |  24  |  1.14    Ca    8.5      19 Dec 2017 06:10  Phos  4.0     12-18  Mg     2.0     12-18              Urinalysis Basic - ( 17 Dec 2017 18:30 )    Color: YELLOW / Appearance: CLEAR / S.024 / pH: 5.5  Gluc: NEGATIVE / Ketone: TRACE  / Bili: NEGATIVE / Urobili: NORMAL mg/dL   Blood: NEGATIVE / Protein: 30 mg/dL / Nitrite: NEGATIVE   Leuk Esterase: NEGATIVE / RBC: 2-5 / WBC 0-2   Sq Epi: OCC / Non Sq Epi: x / Bacteria: x          RADIOLOGY & ADDITIONAL TESTS:    Imaging Personally Reviewed:     Consultant(s) Notes Reviewed:     Care Discussed with Consultants/Other Providers:     Clemente Harman MD  PGY-1 | Internal Medicine  [phone] / [phone]

## 2017-12-19 NOTE — CHART NOTE - NSCHARTNOTEFT_GEN_A_CORE
: Peterson (ID [id number])    Called by the nursing staff that patient was agitated. Assessed patient at bedside with  service. Patient reports pain on his left foot radiating up to his left knee. He reports unable to fall asleep due to the pain. On physical, patient VSS, NAD, LLE no swelling or ecchymosis. No sensory or motor deficit. Will give patient Oxycodone IR 2.5mg as ordered.     He Lou - PGY 1

## 2017-12-19 NOTE — PROGRESS NOTE ADULT - PROBLEM SELECTOR PLAN 2
Unclear if trauma. Arthrocentesis showed high RBCs, no crystals, negative gram stain, WBCs. likely 2/2 trauma in the setting of a/c.  - Holding Eliquis for now  - Gram stain NGTD, f/u cultures  - Orthopedics recommendations appreciated.  - Oxycodone IR 2.5 mg PRN Q6 for pain control  - iStop reference #[id number]; pt only had one prescription for acetaminophen-codeine #3 for 10 days in past 12 months.  - PT anticipating rehab Unclear if trauma. Arthrocentesis showed high RBCs, no crystals, negative gram stain, WBCs. likely 2/2 trauma in the setting of a/c.  - Holding Eliquis for now  - will continue with compression wraps - pt amenable  - Gram stain NGTD, f/u cultures  - Orthopedics recommendations appreciated.  - Oxycodone IR 2.5 mg PRN Q6 for pain control  - iStop reference #[id number]; pt only had one prescription for acetaminophen-codeine #3 for 10 days in past 12 months.  - PT anticipating rehab

## 2017-12-19 NOTE — PROGRESS NOTE ADULT - PROBLEM SELECTOR PLAN 6
Monitor how pt's PO intake. Holding IV fluids 2/2 history of CHF. Monitor pt's PO intake. Holding IV fluids 2/2 history of CHF.

## 2017-12-19 NOTE — PROGRESS NOTE ADULT - ASSESSMENT
89yo Male Hx CHF (unknown EF), atrial fibrillation (on Eliquis) s/p PPM 5-6 years ago at [facility], OA, prostate cancer s/p seed implants and radiation therapy 10-15 years ago, HTN, baseline A&O1-2 (ambulates with walker) a/w Lt knee pain due to traumatic large Lt knee effusion in the context of recent mechanical fall August 2017; Arthrocentesis c/w hemarthrosis; Also found to be dehydrated and anemic.

## 2017-12-19 NOTE — PROGRESS NOTE ADULT - PROBLEM SELECTOR PLAN 3
Pt and family deny trauma while patient ambulated to bathroom. Unclear if fall because only wife at home.   - EKG v-paced  - PT/OT  - Fall precaution  - EP interrogated PPM, which has normal function and no events

## 2017-12-19 NOTE — PROGRESS NOTE ADULT - PROBLEM SELECTOR PLAN 7
Pt complains of lumbar back pain, chronic.  - XR lumbar spine shows T1 compression fracture, degenerative joint changes, osteopenia with no discrete lytic or blastic lesions.  - PT eval

## 2017-12-19 NOTE — PROGRESS NOTE ADULT - PROBLEM SELECTOR PLAN 1
b/l crackles on exam, pt desats to 85-86% yesterday on RA. Currently on 2L NC. Per cardiology office, pt had TTE in 11/2017 showing EF 40-45%. In 11/2017, pt admitted to [facility] for mechanical fall and acute on chronci diastolic CHF. Home lasix was recently stopped due to hypotension.   - s/p lasix 40 mg IV yesterday; starting lasix 40 mg PO BID today  - daily weights  - monitor volume status.   - TTE for baseline. - pt with a  history of diastolic heart failure   - s/p lasix 40 mg IV yesterday; starting lasix 40 mg PO BID today  - daily weights  - monitor volume status.   - TTE for baseline.

## 2017-12-19 NOTE — PROGRESS NOTE ADULT - PROBLEM SELECTOR PLAN 9
Hold Heparin for DVT ppx due to traumatic hemarthrosis. IMPROVE score 1.

## 2017-12-19 NOTE — PROGRESS NOTE ADULT - PROBLEM SELECTOR PLAN 4
Per family patient is on Eliquis for atrial fibrillation s/p PPM placement 5-6 years ago for atrial fibrillation. Currently rate controlled. [id number] score 4.   - holding Eliquis 5mg BID 2/2 hemarthrosis.  - c/w metoprolol tartrate 25 mg BID PO

## 2017-12-20 LAB
BUN SERPL-MCNC: 31 MG/DL — HIGH (ref 7–23)
BUN SERPL-MCNC: 31 MG/DL — HIGH (ref 7–23)
CALCIUM SERPL-MCNC: 8.8 MG/DL — SIGNIFICANT CHANGE UP (ref 8.4–10.5)
CALCIUM SERPL-MCNC: 9.1 MG/DL — SIGNIFICANT CHANGE UP (ref 8.4–10.5)
CHLORIDE SERPL-SCNC: 107 MMOL/L — SIGNIFICANT CHANGE UP (ref 98–107)
CHLORIDE SERPL-SCNC: 99 MMOL/L — SIGNIFICANT CHANGE UP (ref 98–107)
CO2 SERPL-SCNC: 26 MMOL/L — SIGNIFICANT CHANGE UP (ref 22–31)
CO2 SERPL-SCNC: 27 MMOL/L — SIGNIFICANT CHANGE UP (ref 22–31)
CREAT SERPL-MCNC: 1.11 MG/DL — SIGNIFICANT CHANGE UP (ref 0.5–1.3)
CREAT SERPL-MCNC: 1.15 MG/DL — SIGNIFICANT CHANGE UP (ref 0.5–1.3)
GLUCOSE SERPL-MCNC: 149 MG/DL — HIGH (ref 70–99)
GLUCOSE SERPL-MCNC: 160 MG/DL — HIGH (ref 70–99)
HCT VFR BLD CALC: 29.8 % — LOW (ref 39–50)
HGB BLD-MCNC: 9.8 G/DL — LOW (ref 13–17)
MAGNESIUM SERPL-MCNC: 2.1 MG/DL — SIGNIFICANT CHANGE UP (ref 1.6–2.6)
MCHC RBC-ENTMCNC: 31.8 PG — SIGNIFICANT CHANGE UP (ref 27–34)
MCHC RBC-ENTMCNC: 32.9 % — SIGNIFICANT CHANGE UP (ref 32–36)
MCV RBC AUTO: 96.8 FL — SIGNIFICANT CHANGE UP (ref 80–100)
NRBC # FLD: 0 — SIGNIFICANT CHANGE UP
PHOSPHATE SERPL-MCNC: 3.4 MG/DL — SIGNIFICANT CHANGE UP (ref 2.5–4.5)
PLATELET # BLD AUTO: 261 K/UL — SIGNIFICANT CHANGE UP (ref 150–400)
PMV BLD: 10.4 FL — SIGNIFICANT CHANGE UP (ref 7–13)
POTASSIUM SERPL-MCNC: 3.5 MMOL/L — SIGNIFICANT CHANGE UP (ref 3.5–5.3)
POTASSIUM SERPL-MCNC: 3.7 MMOL/L — SIGNIFICANT CHANGE UP (ref 3.5–5.3)
POTASSIUM SERPL-SCNC: 3.5 MMOL/L — SIGNIFICANT CHANGE UP (ref 3.5–5.3)
POTASSIUM SERPL-SCNC: 3.7 MMOL/L — SIGNIFICANT CHANGE UP (ref 3.5–5.3)
RBC # BLD: 3.08 M/UL — LOW (ref 4.2–5.8)
RBC # FLD: 16.3 % — HIGH (ref 10.3–14.5)
SODIUM SERPL-SCNC: 140 MMOL/L — SIGNIFICANT CHANGE UP (ref 135–145)
SODIUM SERPL-SCNC: 151 MMOL/L — HIGH (ref 135–145)
WBC # BLD: 11.71 K/UL — HIGH (ref 3.8–10.5)
WBC # FLD AUTO: 11.71 K/UL — HIGH (ref 3.8–10.5)

## 2017-12-20 PROCEDURE — 99233 SBSQ HOSP IP/OBS HIGH 50: CPT

## 2017-12-20 RX ORDER — ACETAMINOPHEN 500 MG
2 TABLET ORAL
Qty: 0 | Refills: 0 | COMMUNITY
Start: 2017-12-20

## 2017-12-20 RX ORDER — OXYCODONE HYDROCHLORIDE 5 MG/1
2.5 TABLET ORAL
Qty: 0 | Refills: 0 | COMMUNITY
Start: 2017-12-20

## 2017-12-20 RX ORDER — SENNA PLUS 8.6 MG/1
2 TABLET ORAL
Qty: 0 | Refills: 0 | COMMUNITY
Start: 2017-12-20

## 2017-12-20 RX ORDER — DOCUSATE SODIUM 100 MG
1 CAPSULE ORAL
Qty: 0 | Refills: 0 | COMMUNITY
Start: 2017-12-20

## 2017-12-20 RX ORDER — PANTOPRAZOLE SODIUM 20 MG/1
40 TABLET, DELAYED RELEASE ORAL
Qty: 0 | Refills: 0 | Status: DISCONTINUED | OUTPATIENT
Start: 2017-12-20 | End: 2017-12-22

## 2017-12-20 RX ORDER — LOSARTAN POTASSIUM 100 MG/1
1 TABLET, FILM COATED ORAL
Qty: 0 | Refills: 0 | COMMUNITY

## 2017-12-20 RX ORDER — FUROSEMIDE 40 MG
40 TABLET ORAL DAILY
Qty: 0 | Refills: 0 | Status: DISCONTINUED | OUTPATIENT
Start: 2017-12-20 | End: 2017-12-22

## 2017-12-20 RX ORDER — LOSARTAN POTASSIUM 100 MG/1
1 TABLET, FILM COATED ORAL
Qty: 0 | Refills: 0 | COMMUNITY
Start: 2017-12-20

## 2017-12-20 RX ADMIN — Medication 40 MILLIGRAM(S): at 06:00

## 2017-12-20 RX ADMIN — ATORVASTATIN CALCIUM 10 MILLIGRAM(S): 80 TABLET, FILM COATED ORAL at 21:28

## 2017-12-20 RX ADMIN — Medication 650 MILLIGRAM(S): at 06:00

## 2017-12-20 RX ADMIN — Medication 650 MILLIGRAM(S): at 07:00

## 2017-12-20 RX ADMIN — LOSARTAN POTASSIUM 50 MILLIGRAM(S): 100 TABLET, FILM COATED ORAL at 06:00

## 2017-12-20 RX ADMIN — SENNA PLUS 2 TABLET(S): 8.6 TABLET ORAL at 21:27

## 2017-12-20 RX ADMIN — Medication 325 MILLIGRAM(S): at 12:32

## 2017-12-20 RX ADMIN — Medication 1 TABLET(S): at 12:32

## 2017-12-20 RX ADMIN — Medication 650 MILLIGRAM(S): at 23:11

## 2017-12-20 RX ADMIN — AMLODIPINE BESYLATE 5 MILLIGRAM(S): 2.5 TABLET ORAL at 06:00

## 2017-12-20 RX ADMIN — Medication 25 MILLIGRAM(S): at 06:00

## 2017-12-20 RX ADMIN — Medication 650 MILLIGRAM(S): at 13:48

## 2017-12-20 RX ADMIN — LIDOCAINE 1 PATCH: 4 CREAM TOPICAL at 12:32

## 2017-12-20 RX ADMIN — Medication 100 MILLIGRAM(S): at 18:36

## 2017-12-20 RX ADMIN — Medication 650 MILLIGRAM(S): at 14:48

## 2017-12-20 RX ADMIN — Medication 40 MILLIGRAM(S): at 12:32

## 2017-12-20 RX ADMIN — Medication 100 MILLIGRAM(S): at 06:00

## 2017-12-20 RX ADMIN — Medication 25 MILLIGRAM(S): at 18:36

## 2017-12-20 RX ADMIN — Medication 650 MILLIGRAM(S): at 21:27

## 2017-12-20 NOTE — DISCHARGE NOTE ADULT - MEDICATION SUMMARY - MEDICATIONS TO TAKE
I will START or STAY ON the medications listed below when I get home from the hospital:    oxyCODONE  -- 2.5 milligram(s) by mouth every 6 hours, As Needed  -- Indication: For Knee and back pain    acetaminophen 325 mg oral tablet  -- 2 tab(s) by mouth every 8 hours  -- Indication: For Knee and back pain    losartan 50 mg oral tablet  -- 1 tab(s) by mouth once a day  -- Indication: For Hypertension    pravastatin 20 mg oral tablet  -- 1 tab(s) by mouth once a day  -- Indication: For Hyperlipidemia    Metoprolol Succinate ER 50 mg oral tablet, extended release  -- 1 tab(s) by mouth once a day  -- Indication: For Diastolic Heart Failure    amLODIPine 5 mg oral tablet  -- 1 tab(s) by mouth once a day  -- Indication: For Hypertension    furosemide 40 mg oral tablet  -- 1 tab(s) by mouth once a day  -- Indication: For Diastolic Heart Failure    ferrous sulfate 325 mg (65 mg elemental iron) oral tablet  -- 1 tab(s) by mouth once a day  -- Indication: For Supplement    docusate sodium 100 mg oral capsule  -- 1 cap(s) by mouth 2 times a day  -- Indication: For Constipation    senna oral tablet  -- 2 tab(s) by mouth once a day (at bedtime)  -- Indication: For Constipation    magnesium oxide 140 mg oral capsule  -- 1 cap(s) by mouth every other day (at bedtime)  -- Indication: For Supplement    pantoprazole 40 mg oral delayed release tablet  -- 1 tab(s) by mouth once a day (before a meal)  -- Indication: For GERD    Multiple Vitamins oral capsule  -- 1 cap(s) by mouth once a day  -- Indication: For Supplement

## 2017-12-20 NOTE — PROGRESS NOTE ADULT - PROBLEM SELECTOR PLAN 5
Controlled, /47 this am  - c/w amlodipine 5mg daily  - c/w losartan 50mg daily Controlled, /47 this am  - c/w amlodipine 5mg daily  - c/w losartan 50mg daily  - cont to monitor BP

## 2017-12-20 NOTE — PROGRESS NOTE ADULT - PROBLEM SELECTOR PLAN 1
pt with a  history of diastolic heart failure, EF 40-45% in 11/2017. Pt not orthopneic.   - c/w lasix 40 mg PO BID  - daily weights  - monitor volume status. net negative -200 cc, goal net negative 1-1.5 L/day  - TTE for baseline. pt with a  history of diastolic heart failure, EF 40-45% in 11/2017. Pt not orthopneic.   - decrease Lasix 40 mg to daily  - daily weights  - monitor volume status. net negative -200 cc  - TTE for baseline

## 2017-12-20 NOTE — DISCHARGE NOTE ADULT - CARE PLAN
Principal Discharge DX:	Hemarthrosis  Goal:	Improve knee mobility and function at rehabilitation  Instructions for follow-up, activity and diet:	Your knee was swollen due to blood and the blood/fluid was drained. There was no infection in your knee. Follow up with your primary medical doctor within 1-2 weeks of discharge. Take your medications as prescribed.  Secondary Diagnosis:	Acute on chronic heart failure  Instructions for follow-up, activity and diet:	Follow up with your cardiologist (Dr. Donis) within 1-2 weeks of discharge. Take your medications as prescribed.  Secondary Diagnosis:	Atrial fibrillation  Instructions for follow-up, activity and diet:	Your Eliquis blood thinner was stopped for now due to bleeding in your knee. Follow up with your cardiologist (Dr. Donis) within 1-2 weeks of discharge. Take your medications as prescribed.

## 2017-12-20 NOTE — DIETITIAN INITIAL EVALUATION ADULT. - PROBLEM SELECTOR PLAN 1
Traumatic in the context of INR and reported Eliquis regimen;  s/p arthrocentesis of Lt knee by Ortho Sx c/w hemarthrosis   Does not appear septic, will f/u cultures  many rbcs, hold eliquis at this point  suspect hemarthrosis

## 2017-12-20 NOTE — DISCHARGE NOTE ADULT - ADDITIONAL INSTRUCTIONS
Follow up with your primary medical doctor (Dr. Parada) within 1-2 weeks of discharge.   Follow up with your cardiologist (Dr. Donis) within 1-2 weeks of discharge. Follow up with your primary medical doctor (Dr. Parada) within 1-2 weeks of discharge.   Follow up with your cardiologist (Dr. Donis) within 1-2 weeks of discharge.  Please call the phone numbers provided below to make appointments.

## 2017-12-20 NOTE — PROGRESS NOTE ADULT - PROBLEM SELECTOR PLAN 7
Hx of 20lb weight loss and reduced appetite.   - f/u Nutrition consult.  - no lytic/blastic bone lesions of Lumbar x-ray. Hx of 20lb weight loss and reduced appetite.   - started Ensure TID  - f/u Nutrition consult.  - no lytic/blastic bone lesions of Lumbar x-ray.

## 2017-12-20 NOTE — DISCHARGE NOTE ADULT - CARE PROVIDER_API CALL
Tom Donis), Cardiovascular Disease  [address] [place] [address]  Phone: [phone]  Fax: [phone] Tom Donis), Cardiovascular Disease  [address] [place] [address]  Phone: [phone]  Fax: [phone]    Saray Parada), Internal Medicine  [address], [place] [id number]  Phone: [phone]  Fax: [phone]

## 2017-12-20 NOTE — PROGRESS NOTE ADULT - SUBJECTIVE AND OBJECTIVE BOX
Patient is a 89y old  Male who presents with a chief complaint of Lt knee pain (17 Dec 2017 05:00)      SUBJECTIVE / OVERNIGHT EVENTS: No acute overnight events. Spoke with pt with phone  for Nory (Silvia ID# [id number]). Pt states that he feels so-so. Pt states nothing is bothering him. He has some mild chronic back pain, which he does not want pain medication for at this time. Pt states there is mild stomach pain when he presses and he attributes it due to hunger. Pt denies cough, SOB, orthopnea, fevers, chills, n/v, diarrhea, dysuria.     MEDICATIONS  (STANDING):  acetaminophen   Tablet. 650 milliGRAM(s) Oral every 8 hours  amLODIPine   Tablet 5 milliGRAM(s) Oral daily  atorvastatin 10 milliGRAM(s) Oral at bedtime  docusate sodium 100 milliGRAM(s) Oral two times a day  ferrous    sulfate 325 milliGRAM(s) Oral daily  furosemide    Tablet 40 milliGRAM(s) Oral two times a day  lidocaine   Patch 1 Patch Transdermal daily  lidocaine   Patch 1 Patch Transdermal every 24 hours  losartan 50 milliGRAM(s) Oral daily  metoprolol     tartrate 25 milliGRAM(s) Oral two times a day  multivitamin 1 Tablet(s) Oral daily  polyethylene glycol 3350 17 Gram(s) Oral daily  senna 2 Tablet(s) Oral at bedtime    MEDICATIONS  (PRN):  oxyCODONE    IR 2.5 milliGRAM(s) Oral every 8 hours PRN Severe Pain (7 - 10)      Vital Signs Last 24 Hrs  T(C): 36.7 (19 Dec 2017 21:51), Max: 37.1 (19 Dec 2017 14:19)  T(F): 98 (19 Dec 2017 21:51), Max: 98.7 (19 Dec 2017 14:19)  HR: 66 (19 Dec 2017 21:51) (64 - 80)  BP: 129/47 (19 Dec 2017 21:51) (129/47 - 137/65)  BP(mean): --  RR: 22 (19 Dec 2017 21:51) (20 - 24)  SpO2: 90% (19 Dec 2017 21:51) (90% - 96%)    CAPILLARY BLOOD GLUCOSE      POCT Blood Glucose.: 102 mg/dL (20 Dec 2017 05:13)      I&O's Summary    19 Dec 2017 07:01  -  20 Dec 2017 07:00  --------------------------------------------------------  IN: 650 mL / OUT: 850 mL / NET: -200 mL          PHYSICAL EXAM:  GENERAL: NAD, elderly man sitting in bed comfortably, responded appropriately through  line  HEAD:  Atraumatic, Normocephalic  EYES: arcus senilis, conj clear  ENMT: MMM, patent airway  CHEST/LUNG: b/l rales up to midlung, no increased WOB when lying flat  HEART: irregularly irregular, no murmurs, rubs, gallops  ABDOMEN: Soft, Nontender, Nondistended; Bowel sounds present  EXTREMITIES:  L knee swelling - improved from previous +effusion present. L knee not erythematous or warm to touch.  NERVOUS SYSTEM: follows commands, moves all four extremities  PSYCHIATRIC: AOx1, normal mood and affect  SKIN: no visible abrasions, edematous L knee         LABS:                        9.8    11.71 )-----------( 261      ( 20 Dec 2017 06:00 )             29.8     12-19    139  |  102  |  32<H>  ----------------------------<  147<H>  4.4   |  26  |  1.22    Ca    8.7      19 Dec 2017 20:22                    RADIOLOGY & ADDITIONAL TESTS:    Imaging Personally Reviewed:     Consultant(s) Notes Reviewed:     Care Discussed with Consultants/Other Providers:     Clemente Harman MD  PGY-1 | Internal Medicine  [phone] / [phone]

## 2017-12-20 NOTE — DIETITIAN INITIAL EVALUATION ADULT. - PROBLEM SELECTOR PLAN 2
Will need to obtain more detailed collateral form the family in am  (primary team) to confirm mechanical nature of falls rather than cardiogenic (r/o syncope);   EKG ventricularly paced, 73bpm  PT evaluation  Fall precaution

## 2017-12-20 NOTE — DISCHARGE NOTE ADULT - PLAN OF CARE
Improve knee mobility and function at rehabilitation Your knee was swollen due to blood and the blood/fluid was drained. There was no infection in your knee. Follow up with your primary medical doctor within 1-2 weeks of discharge. Take your medications as prescribed. Follow up with your cardiologist (Dr. Donis) within 1-2 weeks of discharge. Take your medications as prescribed. Your Eliquis blood thinner was stopped for now due to bleeding in your knee. Follow up with your cardiologist (Dr. Donis) within 1-2 weeks of discharge. Take your medications as prescribed.

## 2017-12-20 NOTE — DIETITIAN INITIAL EVALUATION ADULT. - PROBLEM SELECTOR PLAN 3
Pt on Eliquis likely for A-fib; This needs to be confirmed with the family or PMD in am;  Eliquis dose unknown and held as above due to hemarthrosis

## 2017-12-20 NOTE — PROGRESS NOTE ADULT - PROBLEM SELECTOR PLAN 2
Unclear if trauma. Arthrocentesis showed high RBCs, no crystals, negative gram stain, WBCs. likely 2/2 trauma in the setting of a/c.  - Holding Eliquis for now  - will continue with compression wraps - pt amenable  - Gram stain NGTD, knee aspirate culture NGTD  - Orthopedics recommendations appreciated.  - Oxycodone IR 2.5 mg PRN Q6 for pain control  - pt amenable to ACE compression bandage.   - PT anticipating rehab

## 2017-12-20 NOTE — DIETITIAN INITIAL EVALUATION ADULT. - OTHER INFO
Pt refused to use  phone. As per RN, Pt ate well on Monday and again this morning. Pt appears to have a good appetite. Pt is edentulous and does not have his Pt refused to use  phone. As per RN, Pt ate well on Monday and again this morning. Pt appears to have a good appetite. Pt is edentulous and does not have his dentures with him but is able to consume a soft diet without problems. It was stated in the note that the Pt had a 20 lb weight loss and reduced appetite.  Information could not be verified.  Pt has had a 7 lb weight loss since in hospital.  Pt had no complaints of GI distress.

## 2017-12-20 NOTE — PROGRESS NOTE ADULT - PROBLEM SELECTOR PLAN 4
Per family patient is on Eliquis for atrial fibrillation s/p PPM placement 5-6 years ago for atrial fibrillation. Currently rate controlled. [id number] score 4.   - holding Eliquis 5mg BID 2/2 hemarthrosis.  - c/w metoprolol tartrate 25 mg BID PO

## 2017-12-20 NOTE — DISCHARGE NOTE ADULT - PATIENT PORTAL LINK FT
“You can access the [facility] Patient Portal, offered by [facility], by registering with the following website: [email or web address]”

## 2017-12-20 NOTE — PROGRESS NOTE ADULT - PROBLEM SELECTOR PLAN 6
Pt complains of lumbar back pain, chronic likely 2/2 T1 compression fracture, degenerative joint changes seen on X-ray. Pain tolerable per pt  - PT eval  - oxycodone IR 2.5 mg Q8 PRN for severe pain

## 2017-12-20 NOTE — PROGRESS NOTE ADULT - ATTENDING COMMENTS
Mild hypernatremia noted - possibly due to overdiuresis from lasix - will change to daily lasix.  discharge planning and coordination ~ 45mins.

## 2017-12-20 NOTE — DISCHARGE NOTE ADULT - HOSPITAL COURSE
89 yo Male w/ Hx of HFrEF (EF 40-45%), s/p PPM, OA, HTN c/o Lt knee pain. Pt AO x1, history provided by the pt is limited due to dementia. States that had a fall three mos ago in which he broke 4 ribs. Since that time he has been noticing also Lt knee pain. No ecchymosis noted or bleeding. He otherwise reports no fever, CP, SOB, abd pain, dysuria. Per ED notes the pt is on a/c Eliquis, however, indication is unclear. Per ED notes, daughter and daughter-in-law report frequent falls in past few months. 89 yo Male w/ Hx of HFpEF (EF 55-60%), s/p PPM, OA, HTN c/o Lt knee pain 2/2 hemarthrosis s/p drainage. Pt AO x1, history provided by the pt is limited due to dementia. States that had a fall three mos ago in which he broke 4 ribs. Since that time he has been noticing also Lt knee pain. No ecchymosis noted or bleeding. He otherwise reports no fever, CP, SOB, abd pain, dysuria. Per ED notes the pt is on a/c Eliquis, however, indication is unclear. Per ED notes, daughter and daughter-in-law report frequent falls in past few months.    Ortho was consulted and drained the knee, found to be hemarthrosis. Knee aspirate cultures NGTD. Pt had O2 desaturation on RA but was asymptomatic and required NC 2L O2. Pt found to be in acute on chronic heart failure with preserved ejection fraction. Pt received lasix 40 IV x1 followed by PO lasix 40 mg bid then daily.     X-ray lumbar spine was performed for chronic lower back pain showing chronic degenerative joint changes and T11 compression fracture. Pt's back and knee pain was controlled with oxycodone and lidocaine patches.     Pt was made DNR/DNI after GOC discussion. PT evaluated pt and recommended rehab. Pt was stable and feeling well and was discharged to rehab.

## 2017-12-20 NOTE — DISCHARGE NOTE ADULT - COMMUNITY RESOURCES
[facility] (Formerly [facility]) [address], [place] [id number] [phone]  [facility] [phone]

## 2017-12-20 NOTE — DISCHARGE NOTE ADULT - CONDITIONS AT DISCHARGE
This Patient is stable for discharge Home , alert and oriented x 2, confused , he is s/p fall at Home ; he does have the Left Knee swelling and this is wrapped with the Ace Bandage; Lidoderm Patch for pan management is applied ; Skin is intact and he is on enhanced supervision for safety.  Instructions are provided to he and Family. This Patient is stable for discharge Home , alert and oriented x 2, confused , he is s/p fall at Home ; he does have the Left Knee swelling and this is wrapped with the Ace Bandage; Lidoderm Patch for pan management is applied ; Skin is intact and he is on enhanced supervision for safety. he did have a bowel movement today. Instructions are provided to he and Family. This Patient is stable for discharge Home , alert and oriented x 2, confused , he is s/p fall at Home ; he does have the Left Knee swelling and this is wrapped with the Ace Bandage; Lidoderm Patch for pan management is applied ; Skin is intact and he is on enhanced supervision for safety. he did have a bowel movement today. Instructions are provided to the Facility.

## 2017-12-21 LAB
BUN SERPL-MCNC: 32 MG/DL — HIGH (ref 7–23)
CALCIUM SERPL-MCNC: 9 MG/DL — SIGNIFICANT CHANGE UP (ref 8.4–10.5)
CHLORIDE SERPL-SCNC: 100 MMOL/L — SIGNIFICANT CHANGE UP (ref 98–107)
CK MB BLD-MCNC: 1.16 NG/ML — SIGNIFICANT CHANGE UP (ref 1–6.6)
CK SERPL-CCNC: 55 U/L — SIGNIFICANT CHANGE UP (ref 30–200)
CO2 SERPL-SCNC: 25 MMOL/L — SIGNIFICANT CHANGE UP (ref 22–31)
CREAT SERPL-MCNC: 1.1 MG/DL — SIGNIFICANT CHANGE UP (ref 0.5–1.3)
GLUCOSE SERPL-MCNC: 131 MG/DL — HIGH (ref 70–99)
HCT VFR BLD CALC: 32 % — LOW (ref 39–50)
HGB BLD-MCNC: 10.3 G/DL — LOW (ref 13–17)
MAGNESIUM SERPL-MCNC: 2 MG/DL — SIGNIFICANT CHANGE UP (ref 1.6–2.6)
MCHC RBC-ENTMCNC: 30.5 PG — SIGNIFICANT CHANGE UP (ref 27–34)
MCHC RBC-ENTMCNC: 32.2 % — SIGNIFICANT CHANGE UP (ref 32–36)
MCV RBC AUTO: 94.7 FL — SIGNIFICANT CHANGE UP (ref 80–100)
NRBC # FLD: 0 — SIGNIFICANT CHANGE UP
PHOSPHATE SERPL-MCNC: 3.8 MG/DL — SIGNIFICANT CHANGE UP (ref 2.5–4.5)
PLATELET # BLD AUTO: 340 K/UL — SIGNIFICANT CHANGE UP (ref 150–400)
PMV BLD: 10.1 FL — SIGNIFICANT CHANGE UP (ref 7–13)
POTASSIUM SERPL-MCNC: 3.8 MMOL/L — SIGNIFICANT CHANGE UP (ref 3.5–5.3)
POTASSIUM SERPL-SCNC: 3.8 MMOL/L — SIGNIFICANT CHANGE UP (ref 3.5–5.3)
RBC # BLD: 3.38 M/UL — LOW (ref 4.2–5.8)
RBC # FLD: 15.9 % — HIGH (ref 10.3–14.5)
SODIUM SERPL-SCNC: 141 MMOL/L — SIGNIFICANT CHANGE UP (ref 135–145)
TROPONIN T SERPL-MCNC: < 0.06 NG/ML — SIGNIFICANT CHANGE UP (ref 0–0.06)
WBC # BLD: 10.06 K/UL — SIGNIFICANT CHANGE UP (ref 3.8–10.5)
WBC # FLD AUTO: 10.06 K/UL — SIGNIFICANT CHANGE UP (ref 3.8–10.5)

## 2017-12-21 PROCEDURE — 93306 TTE W/DOPPLER COMPLETE: CPT | Mod: 26

## 2017-12-21 PROCEDURE — 71010: CPT | Mod: 26

## 2017-12-21 PROCEDURE — 71010: CPT | Mod: 26,77

## 2017-12-21 PROCEDURE — 93010 ELECTROCARDIOGRAM REPORT: CPT

## 2017-12-21 PROCEDURE — 99233 SBSQ HOSP IP/OBS HIGH 50: CPT

## 2017-12-21 RX ADMIN — Medication 650 MILLIGRAM(S): at 13:50

## 2017-12-21 RX ADMIN — OXYCODONE HYDROCHLORIDE 2.5 MILLIGRAM(S): 5 TABLET ORAL at 13:01

## 2017-12-21 RX ADMIN — PANTOPRAZOLE SODIUM 40 MILLIGRAM(S): 20 TABLET, DELAYED RELEASE ORAL at 06:46

## 2017-12-21 RX ADMIN — Medication 100 MILLIGRAM(S): at 18:10

## 2017-12-21 RX ADMIN — Medication 25 MILLIGRAM(S): at 18:10

## 2017-12-21 RX ADMIN — Medication 25 MILLIGRAM(S): at 06:46

## 2017-12-21 RX ADMIN — LOSARTAN POTASSIUM 50 MILLIGRAM(S): 100 TABLET, FILM COATED ORAL at 06:47

## 2017-12-21 RX ADMIN — SENNA PLUS 2 TABLET(S): 8.6 TABLET ORAL at 21:48

## 2017-12-21 RX ADMIN — Medication 40 MILLIGRAM(S): at 06:46

## 2017-12-21 RX ADMIN — OXYCODONE HYDROCHLORIDE 2.5 MILLIGRAM(S): 5 TABLET ORAL at 13:50

## 2017-12-21 RX ADMIN — Medication 100 MILLIGRAM(S): at 06:46

## 2017-12-21 RX ADMIN — Medication 1 TABLET(S): at 12:56

## 2017-12-21 RX ADMIN — Medication 650 MILLIGRAM(S): at 13:02

## 2017-12-21 RX ADMIN — Medication 325 MILLIGRAM(S): at 12:55

## 2017-12-21 RX ADMIN — LIDOCAINE 1 PATCH: 4 CREAM TOPICAL at 00:05

## 2017-12-21 RX ADMIN — Medication 650 MILLIGRAM(S): at 21:48

## 2017-12-21 RX ADMIN — Medication 650 MILLIGRAM(S): at 06:46

## 2017-12-21 RX ADMIN — AMLODIPINE BESYLATE 5 MILLIGRAM(S): 2.5 TABLET ORAL at 06:46

## 2017-12-21 RX ADMIN — Medication 650 MILLIGRAM(S): at 06:47

## 2017-12-21 RX ADMIN — ATORVASTATIN CALCIUM 10 MILLIGRAM(S): 80 TABLET, FILM COATED ORAL at 21:48

## 2017-12-21 RX ADMIN — LIDOCAINE 1 PATCH: 4 CREAM TOPICAL at 12:54

## 2017-12-21 RX ADMIN — POLYETHYLENE GLYCOL 3350 17 GRAM(S): 17 POWDER, FOR SOLUTION ORAL at 12:56

## 2017-12-21 NOTE — PROGRESS NOTE ADULT - PROBLEM SELECTOR PLAN 4
Per family patient is on Eliquis for atrial fibrillation s/p PPM placement 5-6 years ago for atrial fibrillation. Currently rate controlled. [id number] score 4.   - holding Eliquis 5mg BID 2/2 hemarthrosis and fall risk.  - c/w metoprolol tartrate 25 mg BID PO

## 2017-12-21 NOTE — PROGRESS NOTE ADULT - PROBLEM SELECTOR PLAN 1
pt with a  history of diastolic heart failure, EF 40-45% in 11/2017. Pt not orthopneic. SpO2 down to 75 yesterday on RA but pt asymptomatic. Currently requiring 2L NC.   - c/w Lasix 40 mg PO daily  - daily weights  - strict I/Os  - TTE expedited pt with a  history of diastolic heart failure, EF 40-45% in 11/2017. Pt not orthopneic. SpO2 down to 75 yesterday on RA but pt asymptomatic. Currently requiring 2L NC.   - c/w Lasix 40 mg PO daily  - CXR today  - daily weights  - strict I/Os  - TTE expedited

## 2017-12-21 NOTE — CHART NOTE - NSCHARTNOTEFT_GEN_A_CORE
NUTRITION SERVICES                                                                                  MALNUTRITION ALERT     Attention Health Care Provider: Upon nutritional assessment by the Registered Dietitian your patient was determined to meet criteria / has evidence of the following diagnosis/diagnoses:    [ ] Mild Protein Calorie Malnutrition   [ ] Moderate Protein Calorie Malnutrition   [ X] Severe Protein Calorie Malnutrition   [ ] Unspecified Protein Calorie Malnutrition   [ ] Underweight / BMI <19  [ ] Morbid Obesity / BMI >40        By signing this assessment you are acknowledging the diagnosis/diagnoses.       PLAN OF CARE: Refer to Initial Dietitian Evaluation or Nutrition Follow-Up Documentation for Nutritional Recommendations.

## 2017-12-21 NOTE — PROGRESS NOTE ADULT - PROBLEM SELECTOR PLAN 7
Hx of 20lb weight loss and reduced appetite.   - started Ensure TID  - f/u Nutrition consult.  - no lytic/blastic bone lesions of Lumbar x-ray.

## 2017-12-21 NOTE — PROGRESS NOTE ADULT - PROBLEM SELECTOR PLAN 2
Unclear if trauma in setting of anticoagulation. Arthrocentesis showed high RBCs, no crystals, negative gram stain, WBCs, knee aspirate culture NGTD  - Holding Eliquis for now  - pt declined compression wrap  - Gram stain negative, knee aspirate culture NGTD  - Orthopedics recommendations appreciated.  - Oxycodone IR 2.5 mg PRN Q6 for pain control   - PT anticipating rehab

## 2017-12-21 NOTE — PROGRESS NOTE ADULT - SUBJECTIVE AND OBJECTIVE BOX
Patient is a 89y old  Male who presents with a chief complaint of Lt knee pain (20 Dec 2017 11:22)      SUBJECTIVE / OVERNIGHT EVENTS: No acute overnight events. Spoke w/ patient at bedside with phone  ([place] ID#[id number]). Pt feels fine. His only complaint is acid reflux last night. Pt denies fever, chills, n/v, CP, SOB, abd pain, diarrhea, dysuria. Last BM was last night.     MEDICATIONS  (STANDING):  acetaminophen   Tablet. 650 milliGRAM(s) Oral every 8 hours  amLODIPine   Tablet 5 milliGRAM(s) Oral daily  atorvastatin 10 milliGRAM(s) Oral at bedtime  docusate sodium 100 milliGRAM(s) Oral two times a day  ferrous    sulfate 325 milliGRAM(s) Oral daily  furosemide    Tablet 40 milliGRAM(s) Oral daily  lidocaine   Patch 1 Patch Transdermal daily  lidocaine   Patch 1 Patch Transdermal every 24 hours  losartan 50 milliGRAM(s) Oral daily  metoprolol     tartrate 25 milliGRAM(s) Oral two times a day  multivitamin 1 Tablet(s) Oral daily  pantoprazole    Tablet 40 milliGRAM(s) Oral before breakfast  polyethylene glycol 3350 17 Gram(s) Oral daily  senna 2 Tablet(s) Oral at bedtime    MEDICATIONS  (PRN):  oxyCODONE    IR 2.5 milliGRAM(s) Oral every 8 hours PRN Severe Pain (7 - 10)      Vital Signs Last 24 Hrs  T(C): 36.7 (21 Dec 2017 06:44), Max: 37.2 (20 Dec 2017 22:00)  T(F): 98 (21 Dec 2017 06:44), Max: 98.9 (20 Dec 2017 22:00)  HR: 94 (21 Dec 2017 06:44) (63 - 94)  BP: 140/80 (21 Dec 2017 06:44) (140/80 - 149/73)  BP(mean): --  RR: 23 (21 Dec 2017 06:44) (22 - 30)  SpO2: 95% (21 Dec 2017 06:44) (95% - 98%)    CAPILLARY BLOOD GLUCOSE          I&O's Summary    20 Dec 2017 07:01  -  21 Dec 2017 07:00  --------------------------------------------------------  IN: 1191 mL / OUT: 720 mL / NET: 471 mL        PHYSICAL EXAM:  GENERAL: NAD, elderly man sitting in bed comfortably, responded appropriately through  line  HEAD:  Atraumatic, Normocephalic  EYES: arcus senilis, conj clear  ENMT: MMM, patent airway  CHEST/LUNG: b/l rales up to midlung, no increased WOB when lying flat  HEART: irregularly irregular, no murmurs, rubs, gallops  ABDOMEN: Soft, Nontender, Nondistended; Bowel sounds present  EXTREMITIES:  L knee swelling - improved from previous +effusion present. L knee not erythematous or warm to touch.  NERVOUS SYSTEM: follows commands, moves all four extremities  PSYCHIATRIC: AOx1, normal mood and affect  SKIN: no visible abrasions, edematous L knee       LABS:                        9.8    11.71 )-----------( 261      ( 20 Dec 2017 06:00 )             29.8     12-20    140  |  99  |  31<H>  ----------------------------<  160<H>  3.5   |  26  |  1.11    Ca    8.8      20 Dec 2017 17:05  Phos  3.4     12-20  Mg     2.1     12-20                    RADIOLOGY & ADDITIONAL TESTS:    Imaging Personally Reviewed:     Consultant(s) Notes Reviewed:     Care Discussed with Consultants/Other Providers:     Clemente Harman MD  PGY-1 | Internal Medicine  [phone] / [phone]

## 2017-12-21 NOTE — PROGRESS NOTE ADULT - PROBLEM SELECTOR PLAN 3
Pt and family deny trauma while patient ambulated to bathroom. Unclear if fall because only wife at home.   - PT evaluated; pt to go to rehab  - Fall precautions  - EP interrogated PPM, which has normal function and no events

## 2017-12-21 NOTE — PROGRESS NOTE ADULT - ASSESSMENT
89yo Male Hx CHF (unknown EF), atrial fibrillation (on Eliquis) s/p PPM 5-6 years ago at [place], OA, prostate cancer s/p seed implants and radiation therapy 10-15 years ago, HTN, baseline A&O1-2 (ambulates with walker) a/w Lt knee pain due to traumatic large Lt knee effusion in the context of recent mechanical fall August 2017; Arthrocentesis c/w hemarthrosis; now found to be in acute on chronic heart failure.

## 2017-12-21 NOTE — PROGRESS NOTE ADULT - PROBLEM SELECTOR PLAN 5
Controlled, /80 this am  - c/w amlodipine 5mg daily  - c/w losartan 50mg daily  - cont to monitor BP

## 2017-12-21 NOTE — GOALS OF CARE CONVERSATION - PERSONAL ADVANCE DIRECTIVE - CONVERSATION DETAILS
Dr. Harman and I proceeded with a goals of care conversation with the patient and his family earlier this afternoon. He and family elected to proceed with family offering Georgian interpretation. Mr. Chowdhury began the conversation by expressing a desire not to prolong suffering. When asked specifically if he would want to have chest compressions performed if his heart were to stop after a complete description of the CPR procedure and techniques, he decided that he would not want attempts at resuscitation. When asked specifically if he would want to be intubated and placed on a ventilator for breathing support in the event of respiratory distress or inability to protect his airway, the patient demonstrated understanding of the procedures and techniques involved and elected not to be intubated or placed on a ventilator in such a scenario. I informed the patient that we would communicate his wishes to staff and team. Dr. Harman and I had a goals of care conversation with the patient and his family earlier this afternoon. He and family elected to proceed with family offering Malay interpretation. Mr. Chowdhury began the conversation by expressing a desire not to prolong suffering. When asked specifically if he would want to have chest compressions performed if his heart were to stop after a complete description of the CPR procedure and techniques, he decided that he would not want attempts at resuscitation. When asked specifically if he would want to be intubated and placed on a ventilator for breathing support in the event of respiratory distress or inability to protect his airway, the patient demonstrated understanding of the procedures and techniques involved and elected not to be intubated or placed on a ventilator in such a scenario. I informed the patient that we would communicate his wishes to staff and team.

## 2017-12-22 VITALS
TEMPERATURE: 98 F | RESPIRATION RATE: 18 BRPM | OXYGEN SATURATION: 93 % | DIASTOLIC BLOOD PRESSURE: 58 MMHG | SYSTOLIC BLOOD PRESSURE: 107 MMHG | HEART RATE: 64 BPM

## 2017-12-22 PROBLEM — I10 ESSENTIAL (PRIMARY) HYPERTENSION: Chronic | Status: ACTIVE | Noted: 2017-12-16

## 2017-12-22 PROBLEM — I50.9 HEART FAILURE, UNSPECIFIED: Chronic | Status: ACTIVE | Noted: 2017-12-16

## 2017-12-22 PROBLEM — Z95.0 PRESENCE OF CARDIAC PACEMAKER: Chronic | Status: ACTIVE | Noted: 2017-12-16

## 2017-12-22 LAB
BACTERIA FLD CULT: SIGNIFICANT CHANGE UP
BUN SERPL-MCNC: 33 MG/DL — HIGH (ref 7–23)
CALCIUM SERPL-MCNC: 8.7 MG/DL — SIGNIFICANT CHANGE UP (ref 8.4–10.5)
CHLORIDE SERPL-SCNC: 102 MMOL/L — SIGNIFICANT CHANGE UP (ref 98–107)
CO2 SERPL-SCNC: 27 MMOL/L — SIGNIFICANT CHANGE UP (ref 22–31)
CREAT SERPL-MCNC: 1.15 MG/DL — SIGNIFICANT CHANGE UP (ref 0.5–1.3)
GLUCOSE SERPL-MCNC: 98 MG/DL — SIGNIFICANT CHANGE UP (ref 70–99)
HCT VFR BLD CALC: 30.5 % — LOW (ref 39–50)
HGB BLD-MCNC: 9.7 G/DL — LOW (ref 13–17)
MAGNESIUM SERPL-MCNC: 1.9 MG/DL — SIGNIFICANT CHANGE UP (ref 1.6–2.6)
MCHC RBC-ENTMCNC: 30.3 PG — SIGNIFICANT CHANGE UP (ref 27–34)
MCHC RBC-ENTMCNC: 31.8 % — LOW (ref 32–36)
MCV RBC AUTO: 95.3 FL — SIGNIFICANT CHANGE UP (ref 80–100)
NRBC # FLD: 0 — SIGNIFICANT CHANGE UP
PHOSPHATE SERPL-MCNC: 3.7 MG/DL — SIGNIFICANT CHANGE UP (ref 2.5–4.5)
PLATELET # BLD AUTO: 292 K/UL — SIGNIFICANT CHANGE UP (ref 150–400)
PMV BLD: 10.2 FL — SIGNIFICANT CHANGE UP (ref 7–13)
POTASSIUM SERPL-MCNC: 3.6 MMOL/L — SIGNIFICANT CHANGE UP (ref 3.5–5.3)
POTASSIUM SERPL-SCNC: 3.6 MMOL/L — SIGNIFICANT CHANGE UP (ref 3.5–5.3)
RBC # BLD: 3.2 M/UL — LOW (ref 4.2–5.8)
RBC # FLD: 16 % — HIGH (ref 10.3–14.5)
SODIUM SERPL-SCNC: 143 MMOL/L — SIGNIFICANT CHANGE UP (ref 135–145)
WBC # BLD: 8.39 K/UL — SIGNIFICANT CHANGE UP (ref 3.8–10.5)
WBC # FLD AUTO: 8.39 K/UL — SIGNIFICANT CHANGE UP (ref 3.8–10.5)

## 2017-12-22 PROCEDURE — 99239 HOSP IP/OBS DSCHRG MGMT >30: CPT

## 2017-12-22 RX ORDER — POTASSIUM CHLORIDE 20 MEQ
20 PACKET (EA) ORAL ONCE
Qty: 0 | Refills: 0 | Status: COMPLETED | OUTPATIENT
Start: 2017-12-22 | End: 2017-12-22

## 2017-12-22 RX ORDER — APIXABAN 2.5 MG/1
1 TABLET, FILM COATED ORAL
Qty: 0 | Refills: 0 | COMMUNITY

## 2017-12-22 RX ORDER — PANTOPRAZOLE SODIUM 20 MG/1
1 TABLET, DELAYED RELEASE ORAL
Qty: 0 | Refills: 0 | COMMUNITY
Start: 2017-12-22

## 2017-12-22 RX ORDER — FUROSEMIDE 40 MG
1 TABLET ORAL
Qty: 0 | Refills: 0 | COMMUNITY
Start: 2017-12-22

## 2017-12-22 RX ADMIN — Medication 325 MILLIGRAM(S): at 11:55

## 2017-12-22 RX ADMIN — LIDOCAINE 1 PATCH: 4 CREAM TOPICAL at 11:55

## 2017-12-22 RX ADMIN — OXYCODONE HYDROCHLORIDE 2.5 MILLIGRAM(S): 5 TABLET ORAL at 04:12

## 2017-12-22 RX ADMIN — Medication 650 MILLIGRAM(S): at 06:47

## 2017-12-22 RX ADMIN — LIDOCAINE 1 PATCH: 4 CREAM TOPICAL at 00:02

## 2017-12-22 RX ADMIN — Medication 1 TABLET(S): at 11:55

## 2017-12-22 RX ADMIN — Medication 650 MILLIGRAM(S): at 14:07

## 2017-12-22 RX ADMIN — AMLODIPINE BESYLATE 5 MILLIGRAM(S): 2.5 TABLET ORAL at 06:45

## 2017-12-22 RX ADMIN — Medication 650 MILLIGRAM(S): at 14:51

## 2017-12-22 RX ADMIN — Medication 100 MILLIGRAM(S): at 06:45

## 2017-12-22 RX ADMIN — Medication 25 MILLIGRAM(S): at 06:44

## 2017-12-22 RX ADMIN — Medication 40 MILLIGRAM(S): at 06:45

## 2017-12-22 RX ADMIN — Medication 20 MILLIEQUIVALENT(S): at 11:55

## 2017-12-22 RX ADMIN — PANTOPRAZOLE SODIUM 40 MILLIGRAM(S): 20 TABLET, DELAYED RELEASE ORAL at 06:45

## 2017-12-22 RX ADMIN — POLYETHYLENE GLYCOL 3350 17 GRAM(S): 17 POWDER, FOR SOLUTION ORAL at 11:55

## 2017-12-22 RX ADMIN — OXYCODONE HYDROCHLORIDE 2.5 MILLIGRAM(S): 5 TABLET ORAL at 04:42

## 2017-12-22 RX ADMIN — LOSARTAN POTASSIUM 50 MILLIGRAM(S): 100 TABLET, FILM COATED ORAL at 06:45

## 2017-12-22 NOTE — PROGRESS NOTE ADULT - PROBLEM SELECTOR PROBLEM 8
Need for prophylactic measure
Unintentional weight loss

## 2017-12-22 NOTE — PROGRESS NOTE ADULT - ASSESSMENT
89yo Male Hx CHF (unknown EF), atrial fibrillation (on Eliquis) s/p PPM 5-6 years ago at [place], OA, prostate cancer s/p seed implants and radiation therapy 10-15 years ago, HTN, baseline A&O1-2 (ambulates with walker) a/w Lt knee pain due to traumatic large Lt knee effusion in the context of recent mechanical fall August 2017; Arthrocentesis c/w hemarthrosis; now found to be in acute on chronic heart failure.

## 2017-12-22 NOTE — PROGRESS NOTE ADULT - SUBJECTIVE AND OBJECTIVE BOX
Patient is a 89y old  Male who presents with a chief complaint of Lt knee pain (20 Dec 2017 11:22)      SUBJECTIVE / OVERNIGHT EVENTS: Yesterday afternoon, pt felt he was going to die at night. He states he saw the  come and tell him to prepare. Later, he also complained of intermittent CP. EKG, cardiac enzymes were negative. No acute overnight events. Today pt states that he feels so-so. He denies knee/back pain. He says his breathing is ok. Denies fever, chills, n/v, CP, SOB, abd pain, diarrhea, dysuria.     MEDICATIONS  (STANDING):  acetaminophen   Tablet. 650 milliGRAM(s) Oral every 8 hours  amLODIPine   Tablet 5 milliGRAM(s) Oral daily  atorvastatin 10 milliGRAM(s) Oral at bedtime  docusate sodium 100 milliGRAM(s) Oral two times a day  ferrous    sulfate 325 milliGRAM(s) Oral daily  furosemide    Tablet 40 milliGRAM(s) Oral daily  lidocaine   Patch 1 Patch Transdermal daily  lidocaine   Patch 1 Patch Transdermal every 24 hours  losartan 50 milliGRAM(s) Oral daily  metoprolol     tartrate 25 milliGRAM(s) Oral two times a day  multivitamin 1 Tablet(s) Oral daily  pantoprazole    Tablet 40 milliGRAM(s) Oral before breakfast  polyethylene glycol 3350 17 Gram(s) Oral daily  potassium chloride    Tablet ER 20 milliEquivalent(s) Oral once  senna 2 Tablet(s) Oral at bedtime    MEDICATIONS  (PRN):  oxyCODONE    IR 2.5 milliGRAM(s) Oral every 8 hours PRN Severe Pain (7 - 10)      Vital Signs Last 24 Hrs  T(C): 37.1 (22 Dec 2017 06:43), Max: 37.1 (22 Dec 2017 06:43)  T(F): 98.7 (22 Dec 2017 06:43), Max: 98.7 (22 Dec 2017 06:43)  HR: 65 (22 Dec 2017 06:43) (57 - 67)  BP: 144/74 (22 Dec 2017 06:43) (120/65 - 147/72)  BP(mean): --  RR: 16 (22 Dec 2017 06:43) (16 - 18)  SpO2: 98% (22 Dec 2017 06:43) (98% - 100%)    CAPILLARY BLOOD GLUCOSE          I&O's Summary    21 Dec 2017 07:01  -  22 Dec 2017 07:00  --------------------------------------------------------  IN: 0 mL / OUT: 300 mL / NET: -300 mL          PHYSICAL EXAM:  GENERAL: NAD, elderly man sitting in bed comfortably, responded appropriately through  line  HEAD:  Atraumatic, Normocephalic  EYES: arcus senilis, conj clear  ENMT: MMM, patent airway  CHEST/LUNG: b/l rales up to midlung, no increased WOB when lying flat  HEART: regular, no murmurs, rubs, gallops  ABDOMEN: Soft, Nontender, Nondistended; Bowel sounds present  EXTREMITIES:  L knee swelling stable +effusion present. L knee not erythematous or warm to touch.  NERVOUS SYSTEM: follows commands, moves all four extremities  PSYCHIATRIC: AOx1, normal mood and affect  SKIN: no visible abrasions, edematous L knee       LABS:                        9.7    8.39  )-----------( 292      ( 22 Dec 2017 06:30 )             30.5     12-22    143  |  102  |  33<H>  ----------------------------<  98  3.6   |  27  |  1.15    Ca    8.7      22 Dec 2017 06:30  Phos  3.7     12-22  Mg     1.9     12-22          CARDIAC MARKERS ( 21 Dec 2017 14:55 )  x     / < 0.06 ng/mL / 55 u/L / 1.16 ng/mL / x                RADIOLOGY & ADDITIONAL TESTS:    Imaging Personally Reviewed:     Consultant(s) Notes Reviewed:     Care Discussed with Consultants/Other Providers:     Clemente Harman MD  PGY-1 | Internal Medicine  [phone] / [phone] Patient is a 89y old  Male who presents with a chief complaint of Lt knee pain (20 Dec 2017 11:22)      SUBJECTIVE / OVERNIGHT EVENTS: Yesterday afternoon, pt felt he was going to die at night. He states he saw the  come and tell him to prepare. Later, he also complained of intermittent CP. EKG, cardiac enzymes were negative. No acute overnight events. Pt seen and examined at bedside through  phone ([place] ID#[id number]). Today pt states that he feels so-so. He denies knee/back pain. He says his breathing is ok. Denies fever, chills, n/v, CP, SOB, abd pain, diarrhea, dysuria.     MEDICATIONS  (STANDING):  acetaminophen   Tablet. 650 milliGRAM(s) Oral every 8 hours  amLODIPine   Tablet 5 milliGRAM(s) Oral daily  atorvastatin 10 milliGRAM(s) Oral at bedtime  docusate sodium 100 milliGRAM(s) Oral two times a day  ferrous    sulfate 325 milliGRAM(s) Oral daily  furosemide    Tablet 40 milliGRAM(s) Oral daily  lidocaine   Patch 1 Patch Transdermal daily  lidocaine   Patch 1 Patch Transdermal every 24 hours  losartan 50 milliGRAM(s) Oral daily  metoprolol     tartrate 25 milliGRAM(s) Oral two times a day  multivitamin 1 Tablet(s) Oral daily  pantoprazole    Tablet 40 milliGRAM(s) Oral before breakfast  polyethylene glycol 3350 17 Gram(s) Oral daily  potassium chloride    Tablet ER 20 milliEquivalent(s) Oral once  senna 2 Tablet(s) Oral at bedtime    MEDICATIONS  (PRN):  oxyCODONE    IR 2.5 milliGRAM(s) Oral every 8 hours PRN Severe Pain (7 - 10)      Vital Signs Last 24 Hrs  T(C): 37.1 (22 Dec 2017 06:43), Max: 37.1 (22 Dec 2017 06:43)  T(F): 98.7 (22 Dec 2017 06:43), Max: 98.7 (22 Dec 2017 06:43)  HR: 65 (22 Dec 2017 06:43) (57 - 67)  BP: 144/74 (22 Dec 2017 06:43) (120/65 - 147/72)  BP(mean): --  RR: 16 (22 Dec 2017 06:43) (16 - 18)  SpO2: 98% (22 Dec 2017 06:43) (98% - 100%)    CAPILLARY BLOOD GLUCOSE          I&O's Summary    21 Dec 2017 07:01  -  22 Dec 2017 07:00  --------------------------------------------------------  IN: 0 mL / OUT: 300 mL / NET: -300 mL          PHYSICAL EXAM:  GENERAL: NAD, elderly man sitting in bed comfortably, responded appropriately through  line  HEAD:  Atraumatic, Normocephalic  EYES: arcus senilis, conj clear  ENMT: MMM, patent airway  CHEST/LUNG: b/l rales up to midlung, no increased WOB when lying flat  HEART: regular, no murmurs, rubs, gallops  ABDOMEN: Soft, Nontender, Nondistended; Bowel sounds present  EXTREMITIES:  L knee swelling stable +effusion present. L knee not erythematous or warm to touch.  NERVOUS SYSTEM: follows commands, moves all four extremities  PSYCHIATRIC: AOx1, normal mood and affect  SKIN: no visible abrasions, edematous L knee       LABS:                        9.7    8.39  )-----------( 292      ( 22 Dec 2017 06:30 )             30.5     12-22    143  |  102  |  33<H>  ----------------------------<  98  3.6   |  27  |  1.15    Ca    8.7      22 Dec 2017 06:30  Phos  3.7     12-22  Mg     1.9     12-22          CARDIAC MARKERS ( 21 Dec 2017 14:55 )  x     / < 0.06 ng/mL / 55 u/L / 1.16 ng/mL / x                RADIOLOGY & ADDITIONAL TESTS:    Imaging Personally Reviewed:     Consultant(s) Notes Reviewed:     Care Discussed with Consultants/Other Providers:     Clemente Harman MD  PGY-1 | Internal Medicine  [phone] / [phone]

## 2017-12-22 NOTE — PROGRESS NOTE ADULT - PROBLEM SELECTOR PLAN 7
Hx of 20lb weight loss and reduced appetite.   - c/w Ensure TID  - f/u Nutrition consult.  - no lytic/blastic bone lesions of Lumbar x-ray.

## 2017-12-22 NOTE — PROGRESS NOTE ADULT - ATTENDING COMMENTS
Pt no longer feeling as if he is going to die - denies further hallucinations.  appears calm and comfortable today.  discharge planning and coordination ~ 45mins.

## 2017-12-22 NOTE — PROGRESS NOTE ADULT - PROBLEM SELECTOR PLAN 1
pt with a  history of diastolic heart failure, EF 40-45% in 11/2017. Pt not orthopneic. Currently requiring 2L NC for SpO2 but asymptomatic on RA  - c/w Lasix 40 mg PO daily  - TTE shows EF 55-60%, stage 1 diastolic dysfunction  - CXR yesterday shows mild bibasilar atelectasis  - daily weights  - strict I/Os

## 2017-12-22 NOTE — PROGRESS NOTE ADULT - PROBLEM SELECTOR PLAN 8
Hold Heparin for DVT ppx due to traumatic hemarthrosis. IMPROVE score 1.
Hx of 20lb weight loss and reduced appetite.   - Nutrition consult.
Hx of 20lb weight loss and reduced appetite.   - Nutrition consult.  - no lytic/blastic bone lesions of Lumbar x-ray.
Hx of 20lb weight loss and reduced appetite.   -Nutrition consult.

## 2017-12-22 NOTE — PROGRESS NOTE ADULT - PROBLEM SELECTOR PLAN 6
Pt complains of lumbar back pain, chronic likely 2/2 T1 compression fracture, degenerative joint changes seen on X-ray. Pain tolerable per pt  - PT eval  - oxycodone IR 2.5 mg Q8 PRN for severe pain  - lidocaine patch for back

## 2017-12-22 NOTE — PROGRESS NOTE ADULT - PROBLEM SELECTOR PLAN 4
Per family patient is on Eliquis for atrial fibrillation s/p PPM placement 5-6 years ago for atrial fibrillation. Currently rate controlled. [id number] score 4.   - holding Eliquis 5mg BID 2/2 hemarthrosis and fall risk.  - c/w metoprolol tartrate 25 mg BID PO

## 2017-12-22 NOTE — PROGRESS NOTE ADULT - PROBLEM SELECTOR PLAN 2
Improving with pain controlled. Unclear if trauma in setting of anticoagulation. Arthrocentesis showed high RBCs, no crystals, negative gram stain, WBCs, knee aspirate culture NGTD  - Holding Eliquis for now  - Gram stain negative, knee aspirate culture NGTD  - Orthopedics recommendations appreciated.  - Oxycodone IR 2.5 mg PRN Q6 and lidocaine patch for pain control   - Pt to go to rehab

## 2022-07-18 NOTE — ED ADULT NURSE NOTE - NS ED NURSE RECORD ANOTHER VITAL SIGN
[Normal] : normoactive bowel sounds, soft and nontender, no hepatosplenomegaly or masses appreciated Yes, record another set of vital signs

## 2023-08-22 NOTE — DIETITIAN INITIAL EVALUATION ADULT. - PROBLEM SELECTOR PLAN 4
Detail Level: Detailed Unable to perform medication reconciliation; Pt on unknown medication;   -Primary team to clarify home meds in am

## 2023-09-08 NOTE — ED ADULT NURSE NOTE - NS ED PATIENT SAFETY CONCERN
IEL pt, Pt called rx line @ 332 req rf on pended med sent to [facility]/ mk     Pt called again on the rx line upset that these meds still havent been sent in can these be sent please/ mk   
No

## 2024-06-19 NOTE — PATIENT PROFILE ADULT. - NS PRO TALK SOMEONE YN
[As Noted in HPI] : as noted in HPI [Fever] : no fever [Chills] : no chills [Eye Pain] : no eye pain [Red Eyes] : eyes not red [Nosebleeds] : no nosebleeds [Chest Pain] : no chest pain [Shortness Of Breath] : no shortness of breath [Abdominal Pain] : no abdominal pain no [Dysuria] : no dysuria [Confused] : no confusion [Suicidal] : not suicidal [Muscle Weakness] : no muscle weakness [Easy Bleeding] : no tendency for easy bleeding
